# Patient Record
Sex: MALE | Race: BLACK OR AFRICAN AMERICAN | Employment: UNEMPLOYED | URBAN - METROPOLITAN AREA
[De-identification: names, ages, dates, MRNs, and addresses within clinical notes are randomized per-mention and may not be internally consistent; named-entity substitution may affect disease eponyms.]

---

## 2018-04-16 ENCOUNTER — OFFICE VISIT (OUTPATIENT)
Dept: FAMILY MEDICINE CLINIC | Age: 33
End: 2018-04-16

## 2018-04-16 ENCOUNTER — TELEPHONE (OUTPATIENT)
Dept: FAMILY MEDICINE CLINIC | Age: 33
End: 2018-04-16

## 2018-04-16 VITALS
HEIGHT: 72 IN | TEMPERATURE: 98.2 F | WEIGHT: 165.4 LBS | HEART RATE: 70 BPM | BODY MASS INDEX: 22.4 KG/M2 | DIASTOLIC BLOOD PRESSURE: 80 MMHG | SYSTOLIC BLOOD PRESSURE: 122 MMHG | RESPIRATION RATE: 16 BRPM | OXYGEN SATURATION: 95 %

## 2018-04-16 DIAGNOSIS — M54.2 CHRONIC NECK AND BACK PAIN: ICD-10-CM

## 2018-04-16 DIAGNOSIS — D86.9 SARCOIDOSIS: ICD-10-CM

## 2018-04-16 DIAGNOSIS — G89.29 CHRONIC NECK AND BACK PAIN: ICD-10-CM

## 2018-04-16 DIAGNOSIS — F43.10 POST TRAUMATIC STRESS DISORDER (PTSD): ICD-10-CM

## 2018-04-16 DIAGNOSIS — Z00.00 ROUTINE GENERAL MEDICAL EXAMINATION AT A HEALTH CARE FACILITY: Primary | ICD-10-CM

## 2018-04-16 DIAGNOSIS — M54.9 CHRONIC NECK AND BACK PAIN: ICD-10-CM

## 2018-04-16 NOTE — TELEPHONE ENCOUNTER
Please advise Mr. Arora that I have reviewed his hospital records and would recommend that his pulmonologist continue to direct his care and recommend any additional subspecialty consultation as he deems appropriate. I do not recommend any additional lab studies here at this time. A referral to Dr. Familia Garrett for psychiatry evaluation and treatment has been generated as we discussed. He is welcome to return here with any primary care  issues he would like to address.
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
## 2018-04-16 NOTE — PROGRESS NOTES
Tamia Stallings is a 26 y.o. male here to establish care       Tamia Stallings is a 26 y.o. male (: 1985) presenting to address:    Chief Complaint   Patient presents with   [address]     pt here to establish care        Vitals:    18 1510   BP: 122/80   Pulse: 70   Resp: 16   Temp: 98.2 °F (36.8 °C)   TempSrc: Oral   SpO2: 95%   Weight: 165 lb 6.4 oz (75 kg)   Height: 6' (1.829 m)   PainSc:   0 - No pain       Hearing/Vision:   No exam data present    Learning Assessment:     Learning Assessment 2018   PRIMARY LEARNER Patient   HIGHEST LEVEL OF EDUCATION - PRIMARY LEARNER  GRADUATED HIGH SCHOOL OR GED   BARRIERS PRIMARY LEARNER NONE   CO-LEARNER CAREGIVER No   PRIMARY LANGUAGE ENGLISH   LEARNER PREFERENCE PRIMARY DEMONSTRATION     READING   ANSWERED BY patient   RELATIONSHIP SELF     Depression Screening:     PHQ over the last two weeks 2018   Little interest or pleasure in doing things Not at all   Feeling down, depressed or hopeless Not at all   Total Score PHQ 2 0     Fall Risk Assessment:   No flowsheet data found. Abuse Screening:   No flowsheet data found. Coordination of Care Questionaire:   1. Have you been to the ER, urgent care clinic since your last visit? Hospitalized since your last visit? YES [facility]    2. Have you seen or consulted any other health care providers outside of the [address] [place] [address] since your last visit? Include any pap smears or colon screening. NO    Advanced Directive:   1. Do you have an Advanced Directive? NO    2. Would you like information on Advanced Directives?  NO

## 2018-04-17 ENCOUNTER — TELEPHONE (OUTPATIENT)
Dept: FAMILY MEDICINE CLINIC | Age: 33
End: 2018-04-17

## 2018-04-19 ENCOUNTER — HOSPITAL ENCOUNTER (OUTPATIENT)
Dept: PHYSICAL THERAPY | Age: 33
Discharge: HOME | End: 2018-04-19
Payer: SELF-PAY

## 2018-04-19 PROCEDURE — 97140 MANUAL THERAPY 1/> REGIONS: CPT

## 2018-04-19 PROCEDURE — 97535 SELF CARE MNGMENT TRAINING: CPT

## 2018-04-19 PROCEDURE — 97162 PT EVAL MOD COMPLEX 30 MIN: CPT

## 2018-04-19 NOTE — PROGRESS NOTES
PHYSICAL THERAPY - DAILY TREATMENT NOTE    Patient Name: Ryan Handley        Date: 2018  : 1985   YES Patient  Verified  Visit #:      12  Insurance: Payor: /      In time: 11:05 Out time: 11:45   Total Treatment Time: 40     Medicare Time Tracking (below)   Total Timed Codes (min):  na 1:1 Treatment Time:  na     TREATMENT AREA =  Chronic neck and back pain [M54.2, M54.9]    SUBJECTIVE  Pain Level (on 0 to 10 scale):    Medication Changes/New allergies or changes in medical history, any new surgeries or procedures? NO    If yes, update Summary List   Subjective Functional Status/Changes:  []  No changes reported     SEE IE          OBJECTIVE      10 min Manual Therapy: C/s mob, stm R c/s para, ut   Rationale:      decrease pain, increase ROM and increase tissue extensibility to improve patient's ability to perform ADLS     min Patient Education:  YES  Reviewed HEP   []  Progressed/Changed HEP based on: Other Objective/Functional Measures:    SEE IE     Post Treatment Pain Level (on 0 to 10) scale:       ASSESSMENT  Assessment/Changes in Function:     SEE IE     []  See Progress Note/Recertification   Patient will continue to benefit from skilled PT services to modify and progress therapeutic interventions, address functional mobility deficits, address ROM deficits, address strength deficits, analyze and address soft tissue restrictions, analyze and cue movement patterns, analyze and modify body mechanics/ergonomics and assess and modify postural abnormalities to attain remaining goals.    Progress toward goals / Updated goals:         PLAN  []  Upgrade activities as tolerated YES Continue plan of care   []  Discharge due to :    []  Other:      Therapist: Raul Toure, PT, OCS, SCS, CSCS    Date: 2018 Time: 11:35 AM       Future Appointments  Date Time Provider Barry Ndiaye   2018 11:00 AM Loreta Kurtz, PT [facility]   2018 11:00 AM Tom Herr, PTA [facility] 4/26/2018 11:00 AM YAMILKA [facility]
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
## 2018-04-20 ENCOUNTER — HOSPITAL ENCOUNTER (OUTPATIENT)
Dept: PHYSICAL THERAPY | Age: 33
End: 2018-04-20
Payer: SELF-PAY

## 2018-04-24 ENCOUNTER — HOSPITAL ENCOUNTER (OUTPATIENT)
Dept: PHYSICAL THERAPY | Age: 33
Discharge: HOME | End: 2018-04-24
Payer: SELF-PAY

## 2018-04-24 PROCEDURE — 97110 THERAPEUTIC EXERCISES: CPT

## 2018-04-24 PROCEDURE — 97140 MANUAL THERAPY 1/> REGIONS: CPT

## 2018-04-24 NOTE — PROGRESS NOTES
PHYSICAL THERAPY - DAILY TREATMENT NOTE    Patient Name: Iris Vogle        Date: 2018  : 1985   YES Patient  Verified  Visit #:      of   12  Insurance: Payor: SELF PAY / Plan: [facility] SELF PAY / Product Type: Self Pay /      In time: 11 Out time: 1150   Total Treatment Time: 50     Medicare Time Tracking (below)   Total Timed Codes (min):  50 1:1 Treatment Time:       TREATMENT AREA =  Chronic neck and back pain [M54.2, M54.9]    SUBJECTIVE  Pain Level (on 0 to 10 scale):  5  / 10   Medication Changes/New allergies or changes in medical history, any new surgeries or procedures? NO    If yes, update Summary List   Subjective Functional Status/Changes:  []  No changes reported     Reports pain and tingling down the (R) leg. OBJECTIVE    35 min Therapeutic Exercise:  [x]  See flow sheet   Rationale:      increase ROM, increase strength, improve coordination and improve balance to improve the patients ability to perform pain free ADLs. 15 min Manual Therapy: TPR (R) lumbar paraspinals, QL, glute med. Rationale:      decrease pain, increase ROM, increase tissue extensibility and decrease trigger points to improve patient's ability to perform pain free ADLs. min Patient Education:  YES  Reviewed HEP   []  Progressed/Changed HEP based on: Other Objective/Functional Measures: Therex per flow sheet. Post Treatment Pain Level (on 0 to 10) scale:   5  / 10     ASSESSMENT  Assessment/Changes in Function:     Pt reporting pain with all movement, very slow and methodical with movement.        []  See Progress Note/Recertification   Patient will continue to benefit from skilled PT services to modify and progress therapeutic interventions, address functional mobility deficits, address ROM deficits, address strength deficits, analyze and address soft tissue restrictions, analyze and cue movement patterns, analyze and modify body mechanics/ergonomics and assess and modify postural abnormalities to attain remaining goals. Progress toward goals / Updated goals:    Initiated therex.       PLAN  [x]  Upgrade activities as tolerated YES Continue plan of care   []  Discharge due to :    []  Other:      Therapist: Julian Roger PTA    Date: 4/24/2018 Time: 11:11 AM     Future Appointments  Date Time Provider Barry Ndiaye   4/26/2018 11:00 AM Julian Roger PTA [facility]   5/1/2018 11:00 AM Natalie Leone, PT [address] [place] [address]   5/3/2018 11:30 AM Julian Roger PTA [facility]   5/8/2018 11:00 AM Julian Roger PTA [facility]   5/10/2018 11:30 AM Julian Roger PTA [facility]   5/15/2018 11:00 AM Julian Roger PTA [facility]   5/17/2018 10:00 AM Natalie Leone, PT [facility]   5/22/2018 11:30 AM Natalie Leone, PT [facility]   5/24/2018 11:30 AM Julian Roger PTA [facility]   5/29/2018 11:00 AM Julian Roger PTA [facility]   5/31/2018 11:30 AM Julian Roger PTA [facility]

## 2018-04-25 DIAGNOSIS — Z86.39 HISTORY OF HYPERGLYCEMIA: Primary | ICD-10-CM

## 2018-04-26 ENCOUNTER — APPOINTMENT (OUTPATIENT)
Dept: PHYSICAL THERAPY | Age: 33
End: 2018-04-26
Payer: SELF-PAY

## 2018-05-01 ENCOUNTER — HOSPITAL ENCOUNTER (OUTPATIENT)
Dept: PHYSICAL THERAPY | Age: 33
Discharge: HOME | End: 2018-05-01
Payer: SELF-PAY

## 2018-05-01 ENCOUNTER — TELEPHONE (OUTPATIENT)
Dept: FAMILY MEDICINE CLINIC | Age: 33
End: 2018-05-01

## 2018-05-01 PROCEDURE — 97140 MANUAL THERAPY 1/> REGIONS: CPT

## 2018-05-01 PROCEDURE — 97110 THERAPEUTIC EXERCISES: CPT

## 2018-05-01 NOTE — TELEPHONE ENCOUNTER
Patient is needing to have a referral to a new psychiatry. The provider that the patient was being referred to no longer take new psych referrals.     Please assist

## 2018-05-01 NOTE — PROGRESS NOTES
PHYSICAL THERAPY - DAILY TREATMENT NOTE    Patient Name: Preston Weeks        Date: 2018  : 1985   YES Patient  Verified  Visit #:   3   of   12  Insurance: Payor: /      In time: 10:59 Out time: 11:49   Total Treatment Time: 50     Medicare Time Tracking (below)   Total Timed Codes (min):  na 1:1 Treatment Time:  na     TREATMENT AREA =  Chronic neck and back pain [M54.2, M54.9]    SUBJECTIVE  Pain Level (on 0 to 10 scale):  4  / 10   Medication Changes/New allergies or changes in medical history, any new surgeries or procedures? NO    If yes, update Summary List   Subjective Functional Status/Changes:  []  No changes reported     The middle of my back has been bothering me, but the leg pain is a little better          OBJECTIVE  35 min Therapeutic Exercise:  [x]  See flow sheet   Rationale:      increase ROM, increase strength, improve coordination and improve balance to improve the patients ability to perform pain free ADLs.    15 min Manual Therapy: TPR (R) lumbar paraspinals, QL, glute med, t/s mob   Rationale:      decrease pain, increase ROM, increase tissue extensibility and decrease trigger points to improve patient's ability to perform pain free ADLs.     min Patient Education:  YES  Reviewed HEP   []  Progressed/Changed HEP based on:         Other Objective/Functional Measures:    Cont to have sig TTP noted at lumbar para  Decreased mid t/s mobility noted today     Post Treatment Pain Level (on 0 to 10) scale:   3- 10     ASSESSMENT  Assessment/Changes in Function:     Good ratna to all Rx without increase in pain      []  See Progress Note/Recertification   Patient will continue to benefit from skilled PT services to modify and progress therapeutic interventions, address functional mobility deficits, address ROM deficits, address strength deficits, analyze and address soft tissue restrictions, analyze and cue movement patterns, analyze and modify body mechanics/ergonomics and assess and modify postural abnormalities to attain remaining goals.    Progress toward goals / Updated goals:    Slow progress with pain reduction      PLAN  []  Upgrade activities as tolerated YES Continue plan of care   []  Discharge due to :    []  Other:      Therapist: Daniel Nolasco, PT, OCS, SCS, CSCS    Date: 5/1/2018 Time: 10:10 AM       Future Appointments  Date Time Provider Barry Ndiaye   5/1/2018 11:00 AM Nathan Betancourt, PT [facility]   5/3/2018 11:30 AM Claudette Balling, PTA [facility]   5/8/2018 11:00 AM Claudette Balling, PTA [facility]   5/10/2018 11:30 AM Claudette Balling, [facility]   5/15/2018 11:00 AM Claudette Balling, [facility]   5/17/2018 10:00 AM Nathan Betancourt, PT [facility]   5/22/2018 11:30 AM Nathan Betancourt, PT [facility]   5/24/2018 11:30 AM Claudette Balling, [facility]   5/29/2018 11:00 AM Claudette Balling, [facility]   5/31/2018 11:30 AM Claudette Balling, [facility]

## 2018-05-01 NOTE — TELEPHONE ENCOUNTER
Spoke with pt.  Given  info as far as names of facilities and numbers to call to see if he could be seen

## 2018-05-03 ENCOUNTER — HOSPITAL ENCOUNTER (OUTPATIENT)
Dept: PHYSICAL THERAPY | Age: 33
Discharge: HOME | End: 2018-05-03
Payer: SELF-PAY

## 2018-05-03 PROCEDURE — 97110 THERAPEUTIC EXERCISES: CPT

## 2018-05-03 PROCEDURE — 97140 MANUAL THERAPY 1/> REGIONS: CPT

## 2018-05-03 NOTE — PROGRESS NOTES
PHYSICAL THERAPY - DAILY TREATMENT NOTE    Patient Name: Jimi Beckman        Date: 5/3/2018  : 1985   YES Patient  Verified  Visit #:      of   12  Insurance: Payor: SELF PAY / Plan: [facility] SELF PAY / Product Type: Self Pay /      In time: 1130 Out time: 1230   Total Treatment Time: 60     Medicare Time Tracking (below)   Total Timed Codes (min):  60 1:1 Treatment Time:       TREATMENT AREA =  Chronic neck and back pain [M54.2, M54.9]    SUBJECTIVE  Pain Level (on 0 to 10 scale):  3  / 10   Medication Changes/New allergies or changes in medical history, any new surgeries or procedures? NO    If yes, update Summary List   Subjective Functional Status/Changes:  []  No changes reported     No new complaints. OBJECTIVE      40 min Therapeutic Exercise:  [x]  See flow sheet   Rationale:      increase ROM, increase strength and improve coordination to improve the patients ability to perform pain free ADLs. 20 min Manual Therapy: STM/DTM (R) lumbar and thoracic paraspinals. Rationale:      decrease pain, increase ROM, increase tissue extensibility and decrease trigger points to improve patient's ability to perform pain free ADLs. min Patient Education:  YES  Reviewed HEP   []  Progressed/Changed HEP based on: Other Objective/Functional Measures: Therex per flow sheet. Post Treatment Pain Level (on 0 to 10) scale:   5  / 10     ASSESSMENT  Assessment/Changes in Function:     TTP (R) t/s and lumbar paraspinals w/ multiple trigger points noted. []  See Progress Note/Recertification   Patient will continue to benefit from skilled PT services to modify and progress therapeutic interventions, address functional mobility deficits, address ROM deficits, address strength deficits, analyze and address soft tissue restrictions, analyze and cue movement patterns, analyze and modify body mechanics/ergonomics and assess and modify postural abnormalities to attain remaining goals. Progress toward goals / Updated goals:  No change in progress toward LTG's with today's session.       PLAN  [x]  Upgrade activities as tolerated YES Continue plan of care   []  Discharge due to :    []  Other:      Therapist: Felicitas Doherty PTA    Date: 5/3/2018 Time: 11:50 AM     Future Appointments  Date Time Provider Barry Ndiaye   5/8/2018 11:00 AM Felicitas Doherty PTA [facility]   5/10/2018 11:30 AM Felicitas Doherty PTA [facility]   5/15/2018 11:00 AM Felicitas Doherty PTA [facility]   5/17/2018 10:00 AM Dina Garcia, PT [facility]   5/22/2018 11:30 AM Dina Garcia, PT [facility]   5/24/2018 11:30 AM Felicitas Doherty PTA [facility]   5/29/2018 11:00 AM Felicitas Doherty PTA [facility]   5/31/2018 11:30 AM Felicitas Doherty PTA [facility]

## 2018-05-08 ENCOUNTER — HOSPITAL ENCOUNTER (OUTPATIENT)
Dept: PHYSICAL THERAPY | Age: 33
Discharge: HOME | End: 2018-05-08
Payer: SELF-PAY

## 2018-05-08 PROCEDURE — 97110 THERAPEUTIC EXERCISES: CPT

## 2018-05-08 PROCEDURE — 97140 MANUAL THERAPY 1/> REGIONS: CPT

## 2018-05-08 NOTE — PROGRESS NOTES
PHYSICAL THERAPY - DAILY TREATMENT NOTE    Patient Name: Coleen Curry        Date: 2018  : 1985   YES Patient  Verified  Visit #:      of   12  Insurance: Payor: SELF PAY / Plan: [facility] SELF PAY / Product Type: Self Pay /      In time: 1055 Out time: 1145   Total Treatment Time: 50     Medicare Time Tracking (below)   Total Timed Codes (min):  50 1:1 Treatment Time:       TREATMENT AREA =  Chronic neck and back pain [M54.2, M54.9]    SUBJECTIVE  Pain Level (on 0 to 10 scale):    / 10   Medication Changes/New allergies or changes in medical history, any new surgeries or procedures? NO    If yes, update Summary List   Subjective Functional Status/Changes:  []  No changes reported     Pt reporting numbness into the (L) foot, some into the (R) hand as well. OBJECTIVE  35 min Therapeutic Exercise:  [x]  See flow sheet   Rationale:      increase ROM, increase strength and improve coordination to improve the patients ability to perform pain free ADLs. 15 min Manual Therapy: STM/DTM (B) lumbar and thoracic paraspinals, glute/piriformis. Rationale:      decrease pain, increase ROM, increase tissue extensibility and decrease trigger points to improve patient's ability to perform pain free ADLs. min Patient Education:  YES  Reviewed HEP   []  Progressed/Changed HEP based on: Other Objective/Functional Measures: Therex per flow sheet. Post Treatment Pain Level (on 0 to 10) scale:   6  / 10     ASSESSMENT  Assessment/Changes in Function:     Continued tightness on the (R) thoracic/lumbar paraspinals.        []  See Progress Note/Recertification   Patient will continue to benefit from skilled PT services to modify and progress therapeutic interventions, address functional mobility deficits, address ROM deficits, address strength deficits, analyze and address soft tissue restrictions, analyze and cue movement patterns, analyze and modify body mechanics/ergonomics and assess and modify postural abnormalities to attain remaining goals. Progress toward goals / Updated goals:    Continued strengthening to progress toward LTG #2.       PLAN  [x]  Upgrade activities as tolerated YES Continue plan of care   []  Discharge due to :    []  Other:      Therapist: Kenneth Vela PTA    Date: 5/8/2018 Time: 11:12 AM     Future Appointments  Date Time Provider Barry Ndiaye   5/10/2018 10:00 AM Verona Hylton, PT [facility]   5/15/2018 10:30 AM Verona Hylton, PT [facility]   5/17/2018 10:00 AM Verona Hylton, PT [facility]   5/22/2018 11:30 AM Verona Hylton, PT [facility]   5/24/2018 8:00 AM Verona Hylton, PT [facility]   5/29/2018 11:00 AM Kenneth Vela PTA [facility]   5/31/2018 11:30 AM Kenneth Vela PTA [facility]

## 2018-05-10 ENCOUNTER — HOSPITAL ENCOUNTER (OUTPATIENT)
Dept: PHYSICAL THERAPY | Age: 33
Discharge: HOME | End: 2018-05-10
Payer: SELF-PAY

## 2018-05-10 PROCEDURE — 97140 MANUAL THERAPY 1/> REGIONS: CPT

## 2018-05-10 PROCEDURE — 97110 THERAPEUTIC EXERCISES: CPT

## 2018-05-10 NOTE — PROGRESS NOTES
PHYSICAL THERAPY - DAILY TREATMENT NOTE    Patient Name: Tamia Stallings        Date: 5/10/2018  : 1985   YES Patient  Verified  Visit #:     Insurance: Payor: SELF PAY / Plan: [facility] SELF PAY / Product Type: Self Pay /      In time: 10:00 Out time: 10:48   Total Treatment Time: 48     Medicare Time Tracking (below)   Total Timed Codes (min):  na 1:1 Treatment Time:  na     TREATMENT AREA =  Chronic neck and back pain [M54.2, M54.9]    SUBJECTIVE  Pain Level (on 0 to 10 scale):  3- 10   Medication Changes/New allergies or changes in medical history, any new surgeries or procedures? NO    If yes, update Summary List   Subjective Functional Status/Changes:  []  No changes reported     Still gets leg pain if I stay in a certain position for too long. OBJECTIVE  35 min Therapeutic Exercise:  [x]  See flow sheet   Rationale:      increase ROM, increase strength and improve coordination to improve the patients ability to perform pain free ADLs.    13 min Manual Therapy: STM/DTM (B) lumbar and thoracic paraspinals, glute/piriformis. Rationale:      decrease pain, increase ROM, increase tissue extensibility and decrease trigger points to improve patient's ability to perform pain free ADLs. min Patient Education:  YES  Reviewed HEP   []  Progressed/Changed HEP based on:         Other Objective/Functional Measures:    Reeducated about avoiding any flx type of activity including sitting      Post Treatment Pain Level (on 0 to 10) scale:   4   10     ASSESSMENT  Assessment/Changes in Function:     Good ratna to all Rx with min increase in pain      []  See Progress Note/Recertification   Patient will continue to benefit from skilled PT services to modify and progress therapeutic interventions, address functional mobility deficits, address ROM deficits, address strength deficits, analyze and address soft tissue restrictions, analyze and cue movement patterns, analyze and modify body mechanics/ergonomics and assess and modify postural abnormalities to attain remaining goals.    Progress toward goals / Updated goals:    No sig change towards LTGS today     PLAN  []  Upgrade activities as tolerated YES Continue plan of care   []  Discharge due to :    []  Other:      Therapist: Irasema Rivero, PT, OCS, SCS, CSCS    Date: 5/10/2018 Time: 6:22 AM       Future Appointments  Date Time Provider Barry Ndiaye   5/10/2018 10:00 AM Pleasant Hoop, PT [facility]   5/15/2018 10:30 AM Pleasant Hoop, PT [address] [place] [address]   5/17/2018 10:00 AM Pleasant Hoop, PT [facility]   5/22/2018 11:30 AM Pleasant Hoop, PT [facility]   5/24/2018 8:00 AM Pleasant Hoop, PT [facility]   5/29/2018 11:00 AM Cata Rosas PTA [facility]   5/31/2018 11:30 AM Cata Rosas PTA [facility]

## 2018-05-15 ENCOUNTER — HOSPITAL ENCOUNTER (OUTPATIENT)
Dept: PHYSICAL THERAPY | Age: 33
Discharge: HOME | End: 2018-05-15
Payer: SELF-PAY

## 2018-05-15 PROCEDURE — 97140 MANUAL THERAPY 1/> REGIONS: CPT

## 2018-05-15 PROCEDURE — 97110 THERAPEUTIC EXERCISES: CPT

## 2018-05-17 ENCOUNTER — HOSPITAL ENCOUNTER (OUTPATIENT)
Dept: PHYSICAL THERAPY | Age: 33
Discharge: HOME | End: 2018-05-17
Payer: SELF-PAY

## 2018-05-17 PROCEDURE — 97014 ELECTRIC STIMULATION THERAPY: CPT

## 2018-05-17 PROCEDURE — 97110 THERAPEUTIC EXERCISES: CPT

## 2018-05-17 PROCEDURE — 97140 MANUAL THERAPY 1/> REGIONS: CPT

## 2018-05-17 NOTE — PROGRESS NOTES
Encounter Date: 1/19/2018       History     Chief Complaint   Patient presents with    Influenza     9yo F with h/o asthma. Pt here for complaint of vomiting. Onset was this morning after taking sisters tamiflu. Blood in vomiting, streaks of blood x1. Pt has had cough, congestion and fever for 3 days. Sister and father also with similar symptoms. Sister and father were treated for FLu.            Review of patient's allergies indicates:  No Known Allergies  Past Medical History:   Diagnosis Date    Strep throat      Past Surgical History:   Procedure Laterality Date    ADENOIDECTOMY  7/25/13     Family History   Problem Relation Age of Onset    Anesthesia problems Neg Hx     Clotting disorder Neg Hx      Social History   Substance Use Topics    Smoking status: Never Smoker    Smokeless tobacco: Never Used    Alcohol use No     Review of Systems   Constitutional: Positive for activity change, appetite change and fever.   HENT: Positive for congestion.    Eyes: Negative.    Respiratory: Positive for cough.    Cardiovascular: Negative.    Gastrointestinal: Positive for nausea and vomiting.   Genitourinary: Negative.    Musculoskeletal: Positive for myalgias.   Skin: Negative.    Psychiatric/Behavioral: Negative.        Physical Exam     Initial Vitals [01/19/18 0841]   BP Pulse Resp Temp SpO2   -- (!) 120 20 99 °F (37.2 °C) 98 %      MAP       --         Physical Exam    Vitals reviewed.  Constitutional: She appears well-developed and well-nourished.   HENT:   Right Ear: Tympanic membrane normal.   Left Ear: Tympanic membrane normal.   Nose: Nasal discharge present.   Mouth/Throat: Mucous membranes are moist. Dentition is normal. Oropharynx is clear.   Eyes: Conjunctivae and EOM are normal. Pupils are equal, round, and reactive to light.   Neck: Normal range of motion.   Cardiovascular: Normal rate, regular rhythm, S1 normal and S2 normal.   No murmur heard.  Pulmonary/Chest: Effort normal and breath sounds  PHYSICAL THERAPY - DAILY TREATMENT NOTE    Patient Name: Leartis Lesches        Date: 2018  : 1985   YES Patient  Verified  Visit #:   8      12  Insurance: Payor: SELF PAY / Plan: [facility] SELF PAY / Product Type: Self Pay /      In time: 10:00 Out time: 10:50   Total Treatment Time: 50     Medicare Time Tracking (below)   Total Timed Codes (min):  na 1:1 Treatment Time:  na     TREATMENT AREA =  Chronic neck and back pain [M54.2, M54.9]    SUBJECTIVE  Pain Level (on 0 to 10 scale):  4  / 10   Medication Changes/New allergies or changes in medical history, any new surgeries or procedures? NO    If yes, update Summary List   Subjective Functional Status/Changes:  []  No changes reported     I was in pain last night, but once I moved around it got a little better. My pain gets a little better once I recover from soreness then the pain comes back in a little while. 7/10 at the worst.          OBJECTIVE  Modalities Rationale:     decrease pain to improve patient's ability to perform ADLs  10 min [x] Estim, type/location:  IFC                                    []  att     [x]  unatt     []  w/US     []  w/ice    [x]  w/heat    min []  Mechanical Traction: type/lbs                   []  pro   []  sup   []  int   []  cont    []  before manual    []  after manual    min []  Ultrasound, settings/location:      min []  Iontophoresis w/ dexamethasone, location:                                               []  take home patch       []  in clinic    min []  Ice     []  Heat    location/position:     min []  Vasopneumatic Device, press/temp:     min []  Other:    [] Skin assessment post-treatment (if applicable):    []  intact    []  redness- no adverse reaction     []redness  adverse reaction:      30 min Therapeutic Exercise:  [x]  See flow sheet   Rationale:      increase ROM, increase strength and improve coordination to improve the patients ability to perform pain free ADLs.         10 min Manual Therapy: STM/DTM  lumbar and thoracic paraspinals, glute/piriformis.     Rationale:      decrease pain, increase ROM, increase tissue extensibility and decrease trigger points to improve patient's ability to perform pain free ADLs. min Patient Education:  YES  Reviewed HEP   []  Progressed/Changed HEP based on: Other Objective/Functional Measures:    FOTO = 40  GROC = +1     Post Treatment Pain Level (on 0 to 10) scale:   3  / 10     ASSESSMENT  Assessment/Changes in Function:     Good ratna to all Rx without increase in pain      []  See Progress Note/Recertification   Patient will continue to benefit from skilled PT services to modify and progress therapeutic interventions, address functional mobility deficits, address ROM deficits, address strength deficits, analyze and address soft tissue restrictions, analyze and cue movement patterns, analyze and modify body mechanics/ergonomics and assess and modify postural abnormalities to attain remaining goals.    Progress toward goals / Updated goals:    No sig change towards LTGs      PLAN  []  Upgrade activities as tolerated YES Continue plan of care   []  Discharge due to :    []  Other:      Therapist: Zeny López, PT, OCS, SCS, CSCS    Date: 5/17/2018 Time: 9:13 AM       Future Appointments  Date Time Provider Barry Ndiaye   5/17/2018 10:00 AM Rudy Kinney PT [facility]   5/22/2018 11:30 AM Rudy Kinney PT [facility]   5/24/2018 8:00 AM Rudy Kinney PT [facility]   5/29/2018 11:00 AM Sarah Alejandro PTA [facility]   5/31/2018 11:30 AM Sarah Alejandro PTA [facility] normal. No stridor. No respiratory distress. Air movement is not decreased. She has no wheezes. She has no rales. She exhibits no retraction.   Abdominal: Soft. Bowel sounds are normal. She exhibits no distension. There is no tenderness. There is no guarding.   Musculoskeletal: Normal range of motion.   Neurological: She is alert.   Skin: Skin is warm. Capillary refill takes less than 2 seconds.         ED Course   Procedures  Labs Reviewed - No data to display          Medical Decision Making:   Initial Assessment:    11yo F with cough, fever, vomiting and congestion; +FLU contact at home; DDX includes but not limited pneumonia, URI, influenza, AOM, or other. Well appearing on exam with clear BS, very active. .    ED Management:  Flu exposure, URI symptpms and vomiting.     Ondansetron for vomiting here in the ER.     Observe and PO challenge.                        ED Course      Clinical Impression:   There were no encounter diagnoses.                           Christiano Aviles MD  01/19/18 7178

## 2018-05-18 NOTE — PROGRESS NOTES
[address] PHYSICAL THERAPY  [address] [place] [address] 51, [place] [address],[facility], [facility] - Phone: [phone]  Fax: [phone]  PROGRESS NOTE  Patient Name: Raffi Rodriguez : 1985   Treatment/Medical Diagnosis: Chronic neck and back pain [M54.2, M54.9]   Referral Source: Dasia Garcia MD     Date of Initial Visit: 18 Attended Visits: 8 Missed Visits: 0     SUMMARY OF TREATMENT  Raffi Rodriguez has been seen at our clinic 2-3x/wk for a total of 8 visits. Pt treatment has consisted of  therapeutic exercise for directionally preferred ROM ex, hip/core strengthening, and manual therapy(jt mobilization and deep tissue mobilization)  CURRENT STATUS  Pt has had a good tolerance to physical therapy treatment. He reports being compliant with home exercise program which includes directional preferred exercise to reduce discogenic LE symptom. However, he continues to continues to complain of difficulty and increase pain with prolonged sitting and bending. He may benefit from further diagnostic testing and intervention such as an injection. Goal/Measure of Progress Goal Met? 1. Decrease Max pain by 50-75% to assist with ADLs   Status at last Eval: 8/10 Current Status: 7/10 progressing   2. Increase FOTO score to 59 % show functional improvement   Status at last Eval: 39% Current Status: 40% progressing   3. Will rate >/= +5 on Global Rating of Change and be prepared to DC to HEP. Status at last Eval: na Current Status: +1 progressing     New Goals to be achieved in __4__  weeks:  1. Continue with all goals above   2.     3.     RECOMMENDATIONS    Specifics: 2-3x/wk x 4 more wks  If you have any questions/comments please contact us directly at [phone]. Thank you for allowing us to assist in the care of your patient.     Therapist Signature: Jason Shaikh DPT, OCS, SCS, CSCS Date: 2018     Time: 7:34 AM   NOTE TO PHYSICIAN:  PLEASE COMPLETE THE ORDERS BELOW AND FAX TO   [facility]: [phone]  If you are unable to process this request in 24 hours please contact our office: [phone]    ___ I have read the above report and request that my patient continue as recommended.   ___ I have read the above report and request that my patient continue therapy with the following changes/special instructions:_________________________________________________________   ___ I have read the above report and request that my patient be discharged from therapy.      Physician Signature:        Date:       Time:

## 2018-05-22 ENCOUNTER — HOSPITAL ENCOUNTER (OUTPATIENT)
Dept: PHYSICAL THERAPY | Age: 33
Discharge: HOME | End: 2018-05-22
Payer: SELF-PAY

## 2018-05-22 PROCEDURE — 97110 THERAPEUTIC EXERCISES: CPT

## 2018-05-22 PROCEDURE — 97014 ELECTRIC STIMULATION THERAPY: CPT

## 2018-05-22 PROCEDURE — 97140 MANUAL THERAPY 1/> REGIONS: CPT

## 2018-05-22 NOTE — PROGRESS NOTES
PHYSICAL THERAPY - DAILY TREATMENT NOTE    Patient Name: Michelle Banks        Date: 2018  : 1985   YES Patient  Verified  Visit #:     Insurance: Payor: SELF PAY / Plan: [facility] SELF PAY / Product Type: Self Pay /      In time: 11:25 Out time: 12:35   Total Treatment Time: 70     Medicare Time Tracking (below)   Total Timed Codes (min):  na 1:1 Treatment Time:  na     TREATMENT AREA =  Chronic neck and back pain [M54.2, M54.9]    SUBJECTIVE  Pain Level (on 0 to 10 scale):  4  / 10   Medication Changes/New allergies or changes in medical history, any new surgeries or procedures? NO    If yes, update Summary List   Subjective Functional Status/Changes:  []  No changes reported     Actually, it wasn't too bad this weekend. The stim might have helped. OBJECTIVE  Modalities Rationale:     decrease pain to improve patient's ability to perform ADLs              10 min [x] Estim, type/location:                                  IFC                                    []  att     [x]  unatt     []  w/US     []  w/ice    [x]  w/heat     min []  Mechanical Traction: type/lbs                    []  pro   []  sup   []  int   []  cont    []  before manual    []  after manual     min []  Ultrasound, settings/location:        min []  Iontophoresis w/ dexamethasone, location:                                                []  take home patch       []  in clinic     min []  Ice     []  Heat    location/position:       min []  Vasopneumatic Device, press/temp:       min []  Other:     [] Skin assessment post-treatment (if applicable):    []  intact    []  redness- no adverse reaction     []redness  adverse reaction:      30 min Therapeutic Exercise:  [x]  See flow sheet   Rationale:      increase ROM, increase strength and improve coordination to improve the patients ability to perform pain free ADLs.         20 min Manual Therapy: STM/DTM  lumbar and thoracic paraspinals, glute/piriformis.   Rationale:      decrease pain, increase ROM, increase tissue extensibility and decrease trigger points to improve patient's ability to perform pain free ADLs.       min Patient Education:  YES  Reviewed HEP   []  Progressed/Changed HEP based on: Other Objective/Functional Measures:    Decreased soft tissue tension noted at L para  Increased ROM into ext with prone press ups      Post Treatment Pain Level (on 0 to 10) scale:   3  / 10     ASSESSMENT  Assessment/Changes in Function:     Good ratna to all Rx without increase in pain      []  See Progress Note/Recertification   Patient will continue to benefit from skilled PT services to modify and progress therapeutic interventions, address functional mobility deficits, address ROM deficits, address strength deficits, analyze and address soft tissue restrictions, analyze and cue movement patterns, analyze and modify body mechanics/ergonomics and assess and modify postural abnormalities to attain remaining goals.    Progress toward goals / Updated goals:    Slow progress with pain reduction      PLAN  []  Upgrade activities as tolerated YES Continue plan of care   []  Discharge due to :    []  Other:      Therapist: Anam Pradhan, PT, OCS, SCS, CSCS    Date: 5/22/2018 Time: 10:07 AM       Future Appointments  Date Time Provider Barry Ndiaye   5/22/2018 11:30 AM Aidan Santana PT [facility]   5/24/2018 8:00 AM Aidan Santana PT [facility]   5/29/2018 11:00 AM Kaushal Lindsey PTA [facility]   5/31/2018 11:30 AM Kaushal Lindsey PTA [facility]

## 2018-05-24 ENCOUNTER — OFFICE VISIT (OUTPATIENT)
Dept: FAMILY MEDICINE CLINIC | Age: 33
End: 2018-05-24

## 2018-05-24 ENCOUNTER — HOSPITAL ENCOUNTER (OUTPATIENT)
Dept: PHYSICAL THERAPY | Age: 33
Discharge: HOME | End: 2018-05-24
Payer: SELF-PAY

## 2018-05-24 VITALS
TEMPERATURE: 98.5 F | OXYGEN SATURATION: 98 % | DIASTOLIC BLOOD PRESSURE: 88 MMHG | HEIGHT: 72 IN | BODY MASS INDEX: 23.03 KG/M2 | RESPIRATION RATE: 18 BRPM | SYSTOLIC BLOOD PRESSURE: 130 MMHG | HEART RATE: 76 BPM | WEIGHT: 170 LBS

## 2018-05-24 DIAGNOSIS — D86.9 SARCOIDOSIS: ICD-10-CM

## 2018-05-24 DIAGNOSIS — M54.16 RIGHT LUMBAR RADICULOPATHY: Primary | ICD-10-CM

## 2018-05-24 PROCEDURE — 97014 ELECTRIC STIMULATION THERAPY: CPT

## 2018-05-24 PROCEDURE — 97110 THERAPEUTIC EXERCISES: CPT

## 2018-05-24 PROCEDURE — 97140 MANUAL THERAPY 1/> REGIONS: CPT

## 2018-05-24 NOTE — PROGRESS NOTES
Madeleine Buchanan is a 26 y.o. male here for back pain      Madeleine Buchanan is a 26 y.o. male (: 1985) presenting to address:    Chief Complaint   Patient presents with    Back Pain     pt states he's here for a follow up. discuss possible cortisone shots        Vitals:    18 0959   BP: 130/88   Pulse: 76   Resp: 18   Temp: 98.5 °F (36.9 °C)   TempSrc: Oral   SpO2: 98%   Weight: 170 lb (77.1 kg)   Height: 6' (1.829 m)   PainSc:   4   PainLoc: Back       Hearing/Vision:   No exam data present    Learning Assessment:     Learning Assessment 2018   PRIMARY LEARNER Patient   HIGHEST LEVEL OF EDUCATION - PRIMARY LEARNER  GRADUATED HIGH SCHOOL OR GED   BARRIERS PRIMARY LEARNER NONE   CO-LEARNER CAREGIVER No   PRIMARY LANGUAGE ENGLISH   LEARNER PREFERENCE PRIMARY DEMONSTRATION     READING   ANSWERED BY patient   RELATIONSHIP SELF     Depression Screening:     PHQ over the last two weeks 2018   Little interest or pleasure in doing things Not at all   Feeling down, depressed or hopeless Not at all   Total Score PHQ 2 0     Fall Risk Assessment:   No flowsheet data found. Abuse Screening:   No flowsheet data found. Coordination of Care Questionaire:   1. Have you been to the ER, urgent care clinic since your last visit? Hospitalized since your last visit? NO    2. Have you seen or consulted any other health care providers outside of the [facility] since your last visit? Include any pap smears or colon screening. NO    Advanced Directive:   1. Do you have an Advanced Directive? NO    2. Would you like information on Advanced Directives?  NO

## 2018-05-24 NOTE — PROGRESS NOTES
PHYSICAL THERAPY - DAILY TREATMENT NOTE    Patient Name: Romelia Laureano        Date: 2018  : 1985   YES Patient  Verified  Visit #:   10   of   12  Insurance: Payor: SELF PAY / Plan: [facility] SELF PAY / Product Type: Self Pay /      In time: 8:00 Out time: 9:00   Total Treatment Time: 60     Medicare Time Tracking (below)   Total Timed Codes (min):  na 1:1 Treatment Time:  na     TREATMENT AREA =  Chronic neck and back pain [M54.2, M54.9]    SUBJECTIVE  Pain Level (on 0 to 10 scale):  4  / 10   Medication Changes/New allergies or changes in medical history, any new surgeries or procedures? NO    If yes, update Summary List   Subjective Functional Status/Changes:  []  No changes reported     No new c/o          OBJECTIVE  Modalities Rationale:     decrease pain to improve patient's ability to perform ADLs                        10 min [x] Estim, type/location:                                  FWO                                    []  att     [x]  unatt     []  w/US     []  w/ice    [x]  w/heat      min []  Mechanical Traction: type/lbs                     []  pro   []  sup   []  int   []  cont    []  before manual    []  after manual      min []  Ultrasound, settings/location:          min []  Iontophoresis w/ dexamethasone, location:                                                 []  take home patch       []  in clinic      min []  Ice     []  Heat    location/position:         min []  Vasopneumatic Device, press/temp:         min []  Other:      [] Skin assessment post-treatment (if applicable):    []  intact    []  redness- no adverse reaction     []redness  adverse reaction:      30 min Therapeutic Exercise:  [x]  See flow sheet   Rationale:      increase ROM, increase strength and improve coordination to improve the patients ability to perform pain free ADLs.         20 min Manual Therapy: STM/DTM  lumbar and thoracic paraspinals, QL glute/piriformis.     Rationale:      decrease pain, increase ROM, increase tissue extensibility and decrease trigger points to improve patient's ability to perform pain free ADLs.      min Patient Education:  YES  Reviewed HEP   []  Progressed/Changed HEP based on: Other Objective/Functional Measures:    TTP noted at prox QL noted     Post Treatment Pain Level (on 0 to 10) scale:   3  / 10     ASSESSMENT  Assessment/Changes in Function:     Good ratna to all Rx without increase in pain      []  See Progress Note/Recertification   Patient will continue to benefit from skilled PT services to modify and progress therapeutic interventions, address functional mobility deficits, address ROM deficits, address strength deficits, analyze and address soft tissue restrictions, analyze and cue movement patterns, analyze and modify body mechanics/ergonomics and assess and modify postural abnormalities to attain remaining goals.    Progress toward goals / Updated goals:    Slow progress with pain reduction     PLAN  []  Upgrade activities as tolerated YES Continue plan of care   []  Discharge due to :    []  Other:      Therapist: Zeny López, PT, OCS, SCS, CSCS    Date: 5/24/2018 Time: 6:28 AM       Future Appointments  Date Time Provider Barry Ndiaye   5/24/2018 10:00 AM Zheng Naranjo MD [facility]   5/29/2018 11:00 AM Sarah Alejandro PTA [facility]   5/31/2018 11:30 AM Sarah Alejandro PTA [facility]

## 2018-05-24 NOTE — MR AVS SNAPSHOT
[address] [facility] [address] 
[phone] Patient: Raj Santiago MRN: [id number] SFR:5/5/[birth year]34 Visit Information Date & Time Provider Department Dept. Phone Encounter #  
 5/24/2018 10:00 AM Sandra Clifford, [address] [facility] [phone] [id number] Upcoming Health Maintenance Date Due DTaP/Tdap/Td series (1 - Tdap) 4/7/2004 Influenza Age 5 to Adult 8/1/2018 Allergies as of 5/24/2018  Review Complete On: 5/24/2018 By: Sandra Clifford MD  
 No Known Allergies Current Immunizations  Never Reviewed No immunizations on file. Not reviewed this visit You Were Diagnosed With   
  
 Codes Comments Right lumbar radiculopathy    -  Primary ICD-10-CM: M54.16 
ICD-9-CM: 724.4 Vitals BP Pulse Temp Resp Height(growth percentile) Weight(growth percentile) 130/88 (BP 1 Location: Left arm, BP Patient Position: Sitting) 76 98.5 °F (36.9 °C) (Oral) 18 6' (1.829 m) 170 lb (77.1 kg) SpO2 BMI Smoking Status 98% 23.06 kg/m2 Never Smoker BMI and BSA Data Body Mass Index Body Surface Area 23.06 kg/m 2 1.98 m 2 Your Updated Medication List  
  
Notice  As of 5/24/2018 10:30 AM  
 You have not been prescribed any medications. To-Do List   
 05/24/2018 Imaging:  MRI LUMB SPINE WO CONT   
  
 05/29/2018 11:00 AM  
  Appointment with Yo Washington PTA at [address] [facility] ([phone])  
  
 05/31/2018 11:30 AM  
  Appointment with Yo Washington PTA at [address] [facility] ([phone]) Patient Instructions Lumbar MRI requested - Further disposition pending MRI results. Introducing [facility]! Yrn Salinas introduces "[employer]" patient portal. Now you can access parts of your medical record, email your doctor's office, and request medication refills online.    
 
1. In your internet browser, go to [email or web address] 2. Click on the First Time User? Click Here link in the Sign In box. You will see the New Member Sign Up page. 3. Enter your Acorns Access Code exactly as it appears below. You will not need to use this code after youve completed the sign-up process. If you do not sign up before the expiration date, you must request a new code. · Acorns Access Code: [email or web address] Expires: 7/15/2018  3:12 PM 
 
4. Enter the last four digits of your Social Security Number (xxxx) and Date of Birth (mm/dd/yyyy) as indicated and click Submit. You will be taken to the next sign-up page. 5. Create a Pop Up Archivet ID. This will be your Acorns login ID and cannot be changed, so think of one that is secure and easy to remember. 6. Create a Acorns password. You can change your password at any time. 7. Enter your Password Reset Question and Answer. This can be used at a later time if you forget your password. 8. Enter your e-mail address. You will receive e-mail notification when new information is available in [address] 9. Click Sign Up. You can now view and download portions of your medical record. 10. Click the Download Summary menu link to download a portable copy of your medical information. If you have questions, please visit the Frequently Asked Questions section of the Acorns website. Remember, Acorns is NOT to be used for urgent needs. For medical emergencies, dial 911. Now available from your iPhone and Android! Please provide this summary of care documentation to your next provider. Your primary care clinician is listed as Brando Hurst. If you have any questions after today's visit, please call [phone].
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
## 2018-05-29 ENCOUNTER — HOSPITAL ENCOUNTER (OUTPATIENT)
Dept: PHYSICAL THERAPY | Age: 33
Discharge: HOME | End: 2018-05-29
Payer: SELF-PAY

## 2018-05-29 PROCEDURE — 97110 THERAPEUTIC EXERCISES: CPT

## 2018-05-29 NOTE — PROGRESS NOTES
PHYSICAL THERAPY - DAILY TREATMENT NOTE    Patient Name: Bettie Garcia        Date: 2018  : 1985   YES Patient  Verified  Visit #:     Insurance: Payor: SELF PAY / Plan: [facility] SELF PAY / Product Type: Self Pay /      In time: 1301 Out time: 1205   Total Treatment Time: 60     Medicare Time Tracking (below)   Total Timed Codes (min):  60 1:1 Treatment Time:       TREATMENT AREA =  Chronic neck and back pain [M54.2, M54.9]    SUBJECTIVE  Pain Level (on 0 to 10 scale):   10   Medication Changes/New allergies or changes in medical history, any new surgeries or procedures? NO    If yes, update Summary List   Subjective Functional Status/Changes:  []  No changes reported     No new complaints. OBJECTIVE  60 min Therapeutic Exercise:  [x]  See flow sheet   Rationale:      increase ROM, increase strength and improve coordination to improve the patients ability to perform pain free ADLs. min Patient Education:  YES  Reviewed HEP   []  Progressed/Changed HEP based on: Other Objective/Functional Measures: Therex per flow sheet. Post Treatment Pain Level (on 0 to 10) scale:   3  / 10     ASSESSMENT  Assessment/Changes in Function:     No exacerbation of symptoms with today's session. []  See Progress Note/Recertification   Patient will continue to benefit from skilled PT services to modify and progress therapeutic interventions, address functional mobility deficits, address ROM deficits, address strength deficits, analyze and address soft tissue restrictions, analyze and cue movement patterns, analyze and modify body mechanics/ergonomics and assess and modify postural abnormalities to attain remaining goals. Progress toward goals / Updated goals:    No change in progress toward LTG's with today's session.        PLAN  [x]  Upgrade activities as tolerated YES Continue plan of care   []  Discharge due to :    []  Other:      Therapist: Rea Malcolm PTA Date: 5/29/2018 Time: 11:56 AM     Future Appointments  Date Time Provider Barry Ndiaye   5/31/2018 7:30 AM [facility] RM [facility]   5/31/2018 11:30 AM Uche Fitzpatrick, [facility] [address] [facility]

## 2018-05-31 ENCOUNTER — HOSPITAL ENCOUNTER (OUTPATIENT)
Dept: PHYSICAL THERAPY | Age: 33
Discharge: HOME | End: 2018-05-31
Payer: SELF-PAY

## 2018-05-31 ENCOUNTER — HOSPITAL ENCOUNTER (OUTPATIENT)
Dept: MRI IMAGING | Age: 33
Discharge: HOME | End: 2018-05-31
Attending: FAMILY MEDICINE
Payer: SELF-PAY

## 2018-05-31 DIAGNOSIS — M54.16 RIGHT LUMBAR RADICULOPATHY: ICD-10-CM

## 2018-05-31 PROCEDURE — 97110 THERAPEUTIC EXERCISES: CPT

## 2018-05-31 PROCEDURE — 72148 MRI LUMBAR SPINE W/O DYE: CPT

## 2018-05-31 PROCEDURE — 97140 MANUAL THERAPY 1/> REGIONS: CPT

## 2018-05-31 NOTE — PROGRESS NOTES
Please advise Mr. Arora that his MRI showed:  1. Central L5-S1 disc protrusion abutting descending S1 nerve roots without  evidence of mass effect. 2. 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 No focal disc herniations identified at these levels. 3. Bilateral retroperitoneal adenopathy. Nonspecific pelvic free fluid, abnormal  in this male patient. Does this patient have known underlying malignancy or  adenopathy elsewhere? Reportedly patient does have sarcoidosis which can be  associated with abdominal adenopathy in addition to classic hilar and  mediastinal adenopathy, however free fluid would not be expected. There are no  prior comparison studies. Clinical correlation is recommended. Dedicated CT  abdomen pelvis with IV contrast is suggested for further evaluation.       I can refer him to a physical medicine specialist in [place] to discuss what treatment options are available and recommended regarding the disc protrusion and mild central spinal canal stenosis- please ask if he would like to be referred. I would like him to review the other findings of retroperitoneal adenopathy and pelvic free fluid with the pulmonary medicine and gastroenterology consultants managing his sarcoidosis. Please find out how we can fax this report to them.

## 2018-05-31 NOTE — PROGRESS NOTES
PHYSICAL THERAPY - DAILY TREATMENT NOTE    Patient Name: Yusef Chapman        Date: 2018  : 1985   YES Patient  Verified  Visit #:     Insurance: Payor: SELF PAY / Plan: [facility] SELF PAY / Product Type: Self Pay /      In time: 1130 Out time: 8795   Total Treatment Time: 65     Medicare Time Tracking (below)   Total Timed Codes (min):  55 1:1 Treatment Time:       TREATMENT AREA =  Chronic neck and back pain [M54.2, M54.9]    SUBJECTIVE  Pain Level (on 0 to 10 scale):  4  / 10   Medication Changes/New allergies or changes in medical history, any new surgeries or procedures? NO    If yes, update Summary List   Subjective Functional Status/Changes:  []  No changes reported     Pt reporting getting an MRI earlier today. Will know results either tomorrow or Monday. Says he wants to avoid getting an injection if he can. OBJECTIVE  Modalities Rationale:     decrease pain and increase tissue extensibility to improve patient's ability to perform pain free ADLs. min [] Estim, type/location:                                      []  att     []  unatt     []  w/US     []  w/ice    []  w/heat    min []  Mechanical Traction: type/lbs                   []  pro   []  sup   []  int   []  cont    []  before manual    []  after manual    min []  Ultrasound, settings/location:      min []  Iontophoresis w/ dexamethasone, location:                                               []  take home patch       []  in clinic   10 min []  Ice     [x]  Heat    location/position: Prone, lumbar. min []  Vasopneumatic Device, press/temp:     min []  Other:    [x] Skin assessment post-treatment (if applicable):    [x]  intact    []  redness- no adverse reaction     []redness  adverse reaction:        45 min Therapeutic Exercise:  [x]  See flow sheet   Rationale:      increase ROM, increase strength and improve coordination to improve the patients ability to perform pain free ADLs.       10 min Manual Therapy: STM/DTM and TPR (B) lumbar and thoracic paraspinals, QL. Rationale:      decrease pain, increase ROM, increase tissue extensibility and decrease trigger points to improve patient's ability to perform pain free ADLs. min Patient Education:  YES  Reviewed HEP   []  Progressed/Changed HEP based on: Other Objective/Functional Measures: Therex per flow sheet. TTP (B) t/s paraspinals. Post Treatment Pain Level (on 0 to 10) scale:   3  / 10     ASSESSMENT  Assessment/Changes in Function:     No exacerbation of symptoms with today's session. []  See Progress Note/Recertification   Patient will continue to benefit from skilled PT services to modify and progress therapeutic interventions, address functional mobility deficits, address ROM deficits, address strength deficits, analyze and address soft tissue restrictions, analyze and cue movement patterns, analyze and modify body mechanics/ergonomics and assess and modify postural abnormalities to attain remaining goals. Progress toward goals / Updated goals:    No change in progress toward LTG's with today's session. PLAN  [x]  Upgrade activities as tolerated YES Continue plan of care   []  Discharge due to :    []  Other:      Therapist: Carissa Tucker PTA    Date: 5/31/2018 Time: 11:46 AM     No future appointments.

## 2018-06-01 DIAGNOSIS — M54.16 LUMBAR BACK PAIN WITH RADICULOPATHY AFFECTING RIGHT LOWER EXTREMITY: Primary | ICD-10-CM

## 2018-06-01 NOTE — PROGRESS NOTES
Pt aware of results and advice.  Pt states he would like to be referred to the physical medicine specialist. Also he stated he would come by the office and get a copy of his MRI results to take with him to the specialist

## 2018-06-04 ENCOUNTER — TELEPHONE (OUTPATIENT)
Dept: FAMILY MEDICINE CLINIC | Age: 33
End: 2018-06-04

## 2018-06-04 NOTE — TELEPHONE ENCOUNTER
[facility] was signed by Dr. Kiara Bradley and faxed back to [phone].  Confirmation received

## 2018-06-05 ENCOUNTER — APPOINTMENT (OUTPATIENT)
Dept: PHYSICAL THERAPY | Age: 33
End: 2018-06-05
Payer: SELF-PAY

## 2018-06-05 ENCOUNTER — OFFICE VISIT (OUTPATIENT)
Dept: ORTHOPEDIC SURGERY | Age: 33
End: 2018-06-05

## 2018-06-05 ENCOUNTER — DOCUMENTATION ONLY (OUTPATIENT)
Dept: ORTHOPEDIC SURGERY | Age: 33
End: 2018-06-05

## 2018-06-05 VITALS
HEART RATE: 119 BPM | HEIGHT: 72 IN | DIASTOLIC BLOOD PRESSURE: 82 MMHG | SYSTOLIC BLOOD PRESSURE: 115 MMHG | OXYGEN SATURATION: 97 % | BODY MASS INDEX: 22.81 KG/M2 | WEIGHT: 168.4 LBS | RESPIRATION RATE: 16 BRPM

## 2018-06-05 DIAGNOSIS — M51.9 ANNULAR TEAR: ICD-10-CM

## 2018-06-05 DIAGNOSIS — M79.18 MYOFASCIAL PAIN: Primary | ICD-10-CM

## 2018-06-05 DIAGNOSIS — F43.10 PTSD (POST-TRAUMATIC STRESS DISORDER): ICD-10-CM

## 2018-06-05 DIAGNOSIS — M46.1 SACROILIITIS (HCC): ICD-10-CM

## 2018-06-05 NOTE — PATIENT INSTRUCTIONS
Sacroiliac Joint Pain: Care Instructions  Your Care Instructions    The sacroiliac joints connect the spine and each side of the pelvis. These joints bear the weight and stress of your torso. This makes them easy to injure. Injury or overuse of these joints may cause low back pain. Stress on these joints can cause joint pain. Sacroiliac joint pain is more common in pregnant women. Certain kinds of arthritis also may cause this type of joint pain. Home treatment may help you feel better. So can avoiding activities that stress your back. Your doctor also may recommend physical therapy. This may include doing exercises and stretches to help with pain. You may also learn to use good posture. Follow-up care is a key part of your treatment and safety. Be sure to make and go to all appointments, and call your doctor if you are having problems. It's also a good idea to know your test results and keep a list of the medicines you take. How can you care for yourself at home? · Ask your doctor about light exercises that may help your back pain. Try to do light activity throughout the day. But make sure to take rests as needed. Find a comfortable position for rest, but don't stay in one position for too long. Avoid activities that cause pain. · To apply heat, put a warm water bottle, a heating pad set on low, or a warm cloth on your back. Do not go to sleep with a heating pad on your skin. · Put ice or a cold pack on your back for 10 to 20 minutes at a time. Put a thin cloth between the ice and your skin. · If the doctor gave you a prescription medicine for pain, take it as prescribed. · If you are not taking a prescription pain medicine, ask your doctor if you can take an over-the-counter pain medicine, such as acetaminophen (Tylenol), ibuprofen (Advil, Motrin), or naproxen (Aleve). Read and follow all instructions on the label. Take pain medicines exactly as directed.   · Do not take two or more pain medicines at the same time unless the doctor told you to. Many pain medicines have acetaminophen, which is Tylenol. Too much acetaminophen (Tylenol) can be harmful. · To prevent future back pain, do exercises to stretch and strengthen your back and stomach. Learn how to use good posture, safe lifting techniques, and proper body mechanics. When should you call for help? Call 911 anytime you think you may need emergency care. For example, call if:  ? · You are unable to move a leg at all. ?Call your doctor now or seek immediate medical care if:  ? · You have new or worse symptoms in your legs or buttocks. Symptoms may include:  ¨ Numbness or tingling. ¨ Weakness. ¨ Pain. ? · You lose bladder or bowel control. ? Watch closely for changes in your health, and be sure to contact your doctor if:  ? · You are not getting better as expected. Where can you learn more? Go to http://alvaroFlatBurgerdianne.info/. Enter U385 in the search box to learn more about \"Sacroiliac Joint Pain: Care Instructions. \"  Current as of: March 21, 2017  Content Version: 11.4  © 7646-0319 [employer]. Care instructions adapted under license by [email or web address] (which disclaims liability or warranty for this information). If you have questions about a medical condition or this instruction, always ask your healthcare professional. [address] any warranty or liability for your use of this information. Sacroiliac Pain: Exercises  Your Care Instructions  Here are some examples of typical rehabilitation exercises for your condition. Start each exercise slowly. Ease off the exercise if you start to have pain. Your doctor or physical therapist will tell you when you can start these exercises and which ones will work best for you. How to do the exercises  Knee-to-chest stretch    1. Do not do the knee-to-chest exercise if it causes or increases back or leg pain.   2. Lie on your back with your knees bent and your feet flat on the floor. You can put a small pillow under your head and neck if it is more comfortable. 3. Grasp your hands under one knee and bring the knee to your chest, keeping the other foot flat on the floor. 4. Keep your lower back pressed to the floor. Hold for at least 15 to 30 seconds. 5. Relax and lower the knee to the starting position. Repeat with the other leg. 6. Repeat 2 to 4 times with each leg. 7. To get more stretch, keep your other leg flat on the floor while pulling your knee to your chest.  Bridging    1. Lie on your back with both knees bent. Your knees should be bent about 90 degrees. 2. Tighten your belly muscles by pulling in your belly button toward your spine. Then push your feet into the floor, squeeze your buttocks, and lift your hips off the floor until your shoulders, hips, and knees are all in a straight line. 3. Hold for about 6 seconds as you continue to breathe normally, and then slowly lower your hips back down to the floor and rest for up to 10 seconds. 4. Repeat 8 to 12 times. Hip extension    1. Get down on your hands and knees on the floor. 2. Keeping your back and neck straight, lift one leg straight out behind you. When you lift your leg, keep your hips level. Don't let your back twist, and don't let your hip drop toward the floor. 3. Hold for 6 seconds. Repeat 8 to 12 times with each leg. 4. If you feel steady and strong when you do this exercise, you can make it more difficult. To do this, when you lift your leg, also lift the opposite arm straight out in front of you. For example, lift the left leg and the right arm at the same time. (This is sometimes called the \"bird dog exercise. \") Hold for 6 seconds, and repeat 8 to 12 times on each side. Clamshell    1. Lie on your side with a pillow under your head. Keep your feet and knees together and your knees bent. 2. Raise your top knee, but keep your feet together.  Do not let your hips roll back. Your legs should open up like a clamshell. 3. Hold for 6 seconds. 4. Slowly lower your knee back down. Rest for 10 seconds. 5. Repeat 8 to 12 times. 6. Switch to your other side and repeat steps 1 through 5. Hamstring wall stretch    1. Lie on your back in a doorway, with one leg through the open door. 2. Slide your affected leg up the wall to straighten your knee. You should feel a gentle stretch down the back of your leg. 1. Do not arch your back. 2. Do not bend either knee. 3. Keep one heel touching the floor and the other heel touching the wall. Do not point your toes. 3. Hold the stretch for at least 1 minute to begin. Then try to lengthen the time you hold the stretch to as long as 6 minutes. 4. Switch legs, and repeat steps 1 through 3.  5. Repeat 2 to 4 times. 6. If you do not have a place to do this exercise in a doorway, there is another way to do it:  7. Lie on your back, and bend one knee. 8. Loop a towel under the ball and toes of that foot, and hold the ends of the towel in your hands. 9. Straighten your knee, and slowly pull back on the towel. You should feel a gentle stretch down the back of your leg. 10. Switch legs, and repeat steps 1 through 3.  11. Repeat 2 to 4 times. Lower abdominal strengthening    1. Lie on your back with your knees bent and your feet flat on the floor. 2. Tighten your belly muscles by pulling your belly button in toward your spine. 3. Lift one foot off the floor and bring your knee toward your chest, so that your knee is straight above your hip and your leg is bent like the letter \"L. \"  4. Lift the other knee up to the same position. 5. Lower one leg at a time to the starting position. 6. Keep alternating legs until you have lifted each leg 8 to 12 times. 7. Be sure to keep your belly muscles tight and your back still as you are moving your legs. Be sure to breathe normally. Piriformis stretch    1.  Lie on your back with your legs straight. 2. Lift your affected leg, and bend your knee. With your opposite hand, reach across your body, and then gently pull your knee toward your opposite shoulder. 3. Hold the stretch for 15 to 30 seconds. 4. Switch legs and repeat steps 1 through 3.  5. Repeat 2 to 4 times. Follow-up care is a key part of your treatment and safety. Be sure to make and go to all appointments, and call your doctor if you are having problems. It's also a good idea to know your test results and keep a list of the medicines you take. Where can you learn more? Go to http://alvaro-dianne.info/. Enter U988 in the search box to learn more about \"Sacroiliac Pain: Exercises. \"  Current as of: March 21, 2017  Content Version: 11.4  © 1180-4614 [employer]. Care instructions adapted under license by [employer] (which disclaims liability or warranty for this information). If you have questions about a medical condition or this instruction, always ask your healthcare professional. [place] [address] any warranty or liability for your use of this information. Back Stretches: Exercises  Your Care Instructions  Here are some examples of exercises for stretching your back. Start each exercise slowly. Ease off the exercise if you start to have pain. Your doctor or physical therapist will tell you when you can start these exercises and which ones will work best for you. How to do the exercises  Overhead stretch    8. Stand comfortably with your feet shoulder-width apart. 9. Looking straight ahead, raise both arms over your head and reach toward the ceiling. Do not allow your head to tilt back. 10. Hold for 15 to 30 seconds, then lower your arms to your sides. 11. Repeat 2 to 4 times. Side stretch    5. Stand comfortably with your feet shoulder-width apart. 6. Raise one arm over your head, and then lean to the other side.   7. Slide your hand down your leg as you let the weight of your arm gently stretch your side muscles. Hold for 15 to 30 seconds. 8. Repeat 2 to 4 times on each side. Press-up    5. Lie on your stomach, supporting your body with your forearms. 6. Press your elbows down into the floor to raise your upper back. As you do this, relax your stomach muscles and allow your back to arch without using your back muscles. As your press up, do not let your hips or pelvis come off the floor. 7. Hold for 15 to 30 seconds, then relax. 8. Repeat 2 to 4 times. Relax and rest    7. Lie on your back with a rolled towel under your neck and a pillow under your knees. Extend your arms comfortably to your sides. 8. Relax and breathe normally. 9. Remain in this position for about 10 minutes. 10. If you can, do this 2 or 3 times each day. Follow-up care is a key part of your treatment and safety. Be sure to make and go to all appointments, and call your doctor if you are having problems. It's also a good idea to know your test results and keep a list of the medicines you take. Where can you learn more? Go to http://alvaro-dianne.info/. Enter A771 in the search box to learn more about \"Back Stretches: Exercises. \"  Current as of: March 21, 2017  Content Version: 11.4  © 0750-5845 [employer]. Care instructions adapted under license by [employer] (which disclaims liability or warranty for this information). If you have questions about a medical condition or this instruction, always ask your healthcare professional. [place] [address] any warranty or liability for your use of this information. Low Back Pain: Exercises  Your Care Instructions  Here are some examples of typical rehabilitation exercises for your condition. Start each exercise slowly. Ease off the exercise if you start to have pain.   Your doctor or physical therapist will tell you when you can start these exercises and which ones will work best for you.  How to do the exercises  Press-up    12. Lie on your stomach, supporting your body with your forearms. 13. Press your elbows down into the floor to raise your upper back. As you do this, relax your stomach muscles and allow your back to arch without using your back muscles. As your press up, do not let your hips or pelvis come off the floor. 14. Hold for 15 to 30 seconds, then relax. 15. Repeat 2 to 4 times. Alternate arm and leg (bird dog) exercise    Do this exercise slowly. Try to keep your body straight at all times, and do not let one hip drop lower than the other. 9. Start on the floor, on your hands and knees. 10. Tighten your belly muscles. 11. Raise one leg off the floor, and hold it straight out behind you. Be careful not to let your hip drop down, because that will twist your trunk. 12. Hold for about 6 seconds, then lower your leg and switch to the other leg. 13. Repeat 8 to 12 times on each leg. 14. Over time, work up to holding for 10 to 30 seconds each time. 15. If you feel stable and secure with your leg raised, try raising the opposite arm straight out in front of you at the same time. Knee-to-chest exercise    9. Lie on your back with your knees bent and your feet flat on the floor. 10. Bring one knee to your chest, keeping the other foot flat on the floor (or keeping the other leg straight, whichever feels better on your lower back). 11. Keep your lower back pressed to the floor. Hold for at least 15 to 30 seconds. 12. Relax, and lower the knee to the starting position. 13. Repeat with the other leg. Repeat 2 to 4 times with each leg. 14. To get more stretch, put your other leg flat on the floor while pulling your knee to your chest.  Curl-ups    11. Lie on the floor on your back with your knees bent at a 90-degree angle. Your feet should be flat on the floor, about 12 inches from your buttocks.   12. Cross your arms over your chest. If this bothers your neck, try putting your hands behind your neck (not your head), with your elbows spread apart. 13. Slowly tighten your belly muscles and raise your shoulder blades off the floor. 14. Keep your head in line with your body, and do not press your chin to your chest.  15. Hold this position for 1 or 2 seconds, then slowly lower yourself back down to the floor. 16. Repeat 8 to 12 times. Pelvic tilt exercise    12. Lie on your back with your knees bent. 13. \"Brace\" your stomach. This means to tighten your muscles by pulling in and imagining your belly button moving toward your spine. You should feel like your back is pressing to the floor and your hips and pelvis are rocking back. 14. Hold for about 6 seconds while you breathe smoothly. 15. Repeat 8 to 12 times. Heel dig bridging    8. Lie on your back with both knees bent and your ankles bent so that only your heels are digging into the floor. Your knees should be bent about 90 degrees. 9. Then push your heels into the floor, squeeze your buttocks, and lift your hips off the floor until your shoulders, hips, and knees are all in a straight line. 10. Hold for about 6 seconds as you continue to breathe normally, and then slowly lower your hips back down to the floor and rest for up to 10 seconds. 11. Do 8 to 12 repetitions. Hamstring stretch in doorway    6. Lie on your back in a doorway, with one leg through the open door. 7. Slide your leg up the wall to straighten your knee. You should feel a gentle stretch down the back of your leg. 8. Hold the stretch for at least 15 to 30 seconds. Do not arch your back, point your toes, or bend either knee. Keep one heel touching the floor and the other heel touching the wall. 9. Repeat with your other leg. 10. Do 2 to 4 times for each leg. Hip flexor stretch    1. Kneel on the floor with one knee bent and one leg behind you. Place your forward knee over your foot. Keep your other knee touching the floor.   2. Slowly push your hips forward until you feel a stretch in the upper thigh of your rear leg. 3. Hold the stretch for at least 15 to 30 seconds. Repeat with your other leg. 4. Do 2 to 4 times on each side. Wall sit    1. Stand with your back 10 to 12 inches away from a wall. 2. Lean into the wall until your back is flat against it. 3. Slowly slide down until your knees are slightly bent, pressing your lower back into the wall. 4. Hold for about 6 seconds, then slide back up the wall. 5. Repeat 8 to 12 times. Follow-up care is a key part of your treatment and safety. Be sure to make and go to all appointments, and call your doctor if you are having problems. It's also a good idea to know your test results and keep a list of the medicines you take. Where can you learn more? Go to http://alvaro-dianne.info/. Enter P730 in the search box to learn more about \"Low Back Pain: Exercises. \"  Current as of: March 21, 2017  Content Version: 11.4  © 7906-2757 [employer]. Care instructions adapted under license by [employer] (which disclaims liability or warranty for this information). If you have questions about a medical condition or this instruction, always ask your healthcare professional. [address] any warranty or liability for your use of this information.

## 2018-06-05 NOTE — MR AVS SNAPSHOT
[facility] [address] [place] [address] 
[phone] Patient: Coleen Curry MRN: [id number] QRI:[id number] Visit Information Date & Time Provider Department Dept. Phone Encounter #  
 6/5/2018 10:00 AM Antony Palacios MD [facility], located within [facility] [phone] [id number] Follow-up Instructions Return in about 3 weeks (around 6/26/2018). Upcoming Health Maintenance Date Due DTaP/Tdap/Td series (1 - Tdap) 4/7/2004 Influenza Age 5 to Adult 8/1/2018 Allergies as of 6/5/2018  Review Complete On: 6/5/2018 By: Yash Wilcox LPN No Known Allergies Current Immunizations  Never Reviewed No immunizations on file. Not reviewed this visit You Were Diagnosed With   
  
 Codes Comments Myofascial pain    -  Primary ICD-10-CM: M79.1 ICD-9-CM: 729.1 Sacroiliitis ([facility] [address]     ICD-10-CM: M46.1 ICD-9-CM: 720.2 Annular tear     ICD-10-CM: M51.9 ICD-9-CM: 722.90 PTSD (post-traumatic stress disorder)     ICD-10-CM: F43.10 ICD-9-CM: 309.81 Vitals BP Pulse Resp Height(growth percentile) Weight(growth percentile) SpO2  
 115/82 (!) 119 16 6' (1.829 m) 168 lb 6.4 oz (76.4 kg) 97% BMI Smoking Status 22.84 kg/m2 Never Smoker BMI and BSA Data Body Mass Index Body Surface Area  
 22.84 kg/m 2 1.97 m 2 Your Updated Medication List  
  
Notice  As of 6/5/2018 11:23 AM  
 You have not been prescribed any medications. We Performed the Following REFERRAL TO PHYSICAL THERAPY [IIH34 Custom] Comments:  
 Karina Mendenhall Continue treatment of lumbar myofascial pain Please include treatment for bilateral sacroiliitis Include dry needling protocol REFERRAL TO PSYCHOLOGY [WBN66 Custom] SCHEDULE SURGERY [UUU1479 Custom] Follow-up Instructions Return in about 3 weeks (around 6/26/2018). To-Do List   
 06/08/2018 10:00 AM  
  Appointment with Dina Garcia, PT at [address] [facility] ([phone])  
  
 06/13/2018 10:00 AM  
  Appointment with Felicitas Doherty PTA at [address] [facility] ([phone])  
  
 06/15/2018 10:00 AM  
  Appointment with Dina Garcia, PT at [address] [facility] ([phone])  
  
 06/19/2018 11:30 AM  
  Appointment with Dina Garcia, PT at [address] ([phone])  
  
 06/22/2018 10:00 AM  
  Appointment with Ebanjelica Garcia, PT at [address] ([phone])  
  
 06/26/2018 10:00 AM  
  Appointment with Dina Garcia, PT at [address] ([phone])  
  
 06/28/2018 10:00 AM  
  Appointment with Dina Garcia PT at [address] [facility] ([phone]) Referral Information Referral ID Referred By Referred To  
  
 [id number] [facility]   
   [address], [address] [place], [address] [place] [address] Phone: [phone] Visits Status Start Date End Date 1 New Request 6/5/18 6/5/19 If your referral has a status of pending review or denied, additional information will be sent to support the outcome of this decision. Referral ID Referred By Referred To  
 [id number] [facility] AT [address], PHD  
   [facility], [address] [address] Phone: [phone] Fax: [phone] Visits Status Start Date End Date 1 New Request 6/5/18 6/5/19 If your referral has a status of pending review or denied, additional information will be sent to support the outcome of this decision. Patient Instructions Sacroiliac Joint Pain: Care Instructions Your Care Instructions The sacroiliac joints connect the spine and each side of the pelvis. These joints bear the weight and stress of your torso. This makes them easy to injure. Injury or overuse of these joints may cause low back pain. Stress on these joints can cause joint pain. Sacroiliac joint pain is more common in pregnant women. Certain kinds of arthritis also may cause this type of joint pain. Home treatment may help you feel better. So can avoiding activities that stress your back. Your doctor also may recommend physical therapy. This may include doing exercises and stretches to help with pain. You may also learn to use good posture. Follow-up care is a key part of your treatment and safety. Be sure to make and go to all appointments, and call your doctor if you are having problems. It's also a good idea to know your test results and keep a list of the medicines you take. How can you care for yourself at home? · Ask your doctor about light exercises that may help your back pain. Try to do light activity throughout the day. But make sure to take rests as needed. Find a comfortable position for rest, but don't stay in one position for too long. Avoid activities that cause pain. · To apply heat, put a warm water bottle, a heating pad set on low, or a warm cloth on your back. Do not go to sleep with a heating pad on your skin. · Put ice or a cold pack on your back for 10 to 20 minutes at a time. Put a thin cloth between the ice and your skin. · If the doctor gave you a prescription medicine for pain, take it as prescribed. · If you are not taking a prescription pain medicine, ask your doctor if you can take an over-the-counter pain medicine, such as acetaminophen (Tylenol), ibuprofen (Advil, Motrin), or naproxen (Aleve). Read and follow all instructions on the label. Take pain medicines exactly as directed. · Do not take two or more pain medicines at the same time unless the doctor told you to. Many pain medicines have acetaminophen, which is Tylenol. Too much acetaminophen (Tylenol) can be harmful.  
· To prevent future back pain, do exercises to stretch and strengthen your back and stomach. Learn how to use good posture, safe lifting techniques, and proper body mechanics. When should you call for help? Call 911 anytime you think you may need emergency care. For example, call if: 
? · You are unable to move a leg at all. ?Call your doctor now or seek immediate medical care if: 
? · You have new or worse symptoms in your legs or buttocks. Symptoms may include: ¨ Numbness or tingling. ¨ Weakness. ¨ Pain. ? · You lose bladder or bowel control. ? Watch closely for changes in your health, and be sure to contact your doctor if: 
? · You are not getting better as expected. Where can you learn more? Go to http://alvaroi.TVdianne.info/. Enter Q464 in the search box to learn more about \"Sacroiliac Joint Pain: Care Instructions. \" Current as of: March 21, 2017 Content Version: 11.4 © 1957-7410 [employer]. Care instructions adapted under license by [employer] (which disclaims liability or warranty for this information). If you have questions about a medical condition or this instruction, always ask your healthcare professional. [place] [address] any warranty or liability for your use of this information. Sacroiliac Pain: Exercises Your Care Instructions Here are some examples of typical rehabilitation exercises for your condition. Start each exercise slowly. Ease off the exercise if you start to have pain. Your doctor or physical therapist will tell you when you can start these exercises and which ones will work best for you. How to do the exercises Knee-to-chest stretch 1. Do not do the knee-to-chest exercise if it causes or increases back or leg pain. 2. Lie on your back with your knees bent and your feet flat on the floor. You can put a small pillow under your head and neck if it is more comfortable.  
3. Grasp your hands under one knee and bring the knee to your chest, keeping the other foot flat on the floor. 4. Keep your lower back pressed to the floor. Hold for at least 15 to 30 seconds. 5. Relax and lower the knee to the starting position. Repeat with the other leg. 6. Repeat 2 to 4 times with each leg. 7. To get more stretch, keep your other leg flat on the floor while pulling your knee to your chest. 
Bridging 1. Lie on your back with both knees bent. Your knees should be bent about 90 degrees. 2. Tighten your belly muscles by pulling in your belly button toward your spine. Then push your feet into the floor, squeeze your buttocks, and lift your hips off the floor until your shoulders, hips, and knees are all in a straight line. 3. Hold for about 6 seconds as you continue to breathe normally, and then slowly lower your hips back down to the floor and rest for up to 10 seconds. 4. Repeat 8 to 12 times. Hip extension 1. Get down on your hands and knees on the floor. 2. Keeping your back and neck straight, lift one leg straight out behind you. When you lift your leg, keep your hips level. Don't let your back twist, and don't let your hip drop toward the floor. 3. Hold for 6 seconds. Repeat 8 to 12 times with each leg. 4. If you feel steady and strong when you do this exercise, you can make it more difficult. To do this, when you lift your leg, also lift the opposite arm straight out in front of you. For example, lift the left leg and the right arm at the same time. (This is sometimes called the \"bird dog exercise. \") Hold for 6 seconds, and repeat 8 to 12 times on each side. Clamshell 1. Lie on your side with a pillow under your head. Keep your feet and knees together and your knees bent. 2. Raise your top knee, but keep your feet together. Do not let your hips roll back. Your legs should open up like a clamshell. 3. Hold for 6 seconds. 4. Slowly lower your knee back down. Rest for 10 seconds. 5. Repeat 8 to 12 times. 6. Switch to your other side and repeat steps 1 through 5. Hamstring wall stretch 1. Lie on your back in a doorway, with one leg through the open door. 2. Slide your affected leg up the wall to straighten your knee. You should feel a gentle stretch down the back of your leg. 1. Do not arch your back. 2. Do not bend either knee. 3. Keep one heel touching the floor and the other heel touching the wall. Do not point your toes. 3. Hold the stretch for at least 1 minute to begin. Then try to lengthen the time you hold the stretch to as long as 6 minutes. 4. Switch legs, and repeat steps 1 through 3. 
5. Repeat 2 to 4 times. 6. If you do not have a place to do this exercise in a doorway, there is another way to do it: 
7. Lie on your back, and bend one knee. 8. Loop a towel under the ball and toes of that foot, and hold the ends of the towel in your hands. 9. Straighten your knee, and slowly pull back on the towel. You should feel a gentle stretch down the back of your leg. 10. Switch legs, and repeat steps 1 through 3. 
11. Repeat 2 to 4 times. Lower abdominal strengthening 1. Lie on your back with your knees bent and your feet flat on the floor. 2. Tighten your belly muscles by pulling your belly button in toward your spine. 3. Lift one foot off the floor and bring your knee toward your chest, so that your knee is straight above your hip and your leg is bent like the letter \"L. \" 
4. Lift the other knee up to the same position. 5. Lower one leg at a time to the starting position. 6. Keep alternating legs until you have lifted each leg 8 to 12 times. 7. Be sure to keep your belly muscles tight and your back still as you are moving your legs. Be sure to breathe normally. Piriformis stretch 1. Lie on your back with your legs straight. 2. Lift your affected leg, and bend your knee. With your opposite hand, reach across your body, and then gently pull your knee toward your opposite shoulder. 3. Hold the stretch for 15 to 30 seconds. 4. Switch legs and repeat steps 1 through 3. 
5. Repeat 2 to 4 times. Follow-up care is a key part of your treatment and safety. Be sure to make and go to all appointments, and call your doctor if you are having problems. It's also a good idea to know your test results and keep a list of the medicines you take. Where can you learn more? Go to http://alvaro-dianne.info/. Enter B398 in the search box to learn more about \"Sacroiliac Pain: Exercises. \" Current as of: March 21, 2017 Content Version: 11.4 © 2754-7353 [employer]. Care instructions adapted under license by [employer] (which disclaims liability or warranty for this information). If you have questions about a medical condition or this instruction, always ask your healthcare professional. [place] [address] any warranty or liability for your use of this information. Back Stretches: Exercises Your Care Instructions Here are some examples of exercises for stretching your back. Start each exercise slowly. Ease off the exercise if you start to have pain. Your doctor or physical therapist will tell you when you can start these exercises and which ones will work best for you. How to do the exercises Overhead stretch 8. Stand comfortably with your feet shoulder-width apart. 9. Looking straight ahead, raise both arms over your head and reach toward the ceiling. Do not allow your head to tilt back. 10. Hold for 15 to 30 seconds, then lower your arms to your sides. 11. Repeat 2 to 4 times. Side stretch 5. Stand comfortably with your feet shoulder-width apart. 6. Raise one arm over your head, and then lean to the other side. 7. Slide your hand down your leg as you let the weight of your arm gently stretch your side muscles. Hold for 15 to 30 seconds. 8. Repeat 2 to 4 times on each side. Press-up 5. Lie on your stomach, supporting your body with your forearms. 6. Press your elbows down into the floor to raise your upper back. As you do this, relax your stomach muscles and allow your back to arch without using your back muscles. As your press up, do not let your hips or pelvis come off the floor. 7. Hold for 15 to 30 seconds, then relax. 8. Repeat 2 to 4 times. Relax and rest 
 
7. Lie on your back with a rolled towel under your neck and a pillow under your knees. Extend your arms comfortably to your sides. 8. Relax and breathe normally. 9. Remain in this position for about 10 minutes. 10. If you can, do this 2 or 3 times each day. Follow-up care is a key part of your treatment and safety. Be sure to make and go to all appointments, and call your doctor if you are having problems. It's also a good idea to know your test results and keep a list of the medicines you take. Where can you learn more? Go to http://alvaro-dianne.info/. Enter Y764 in the search box to learn more about \"Back Stretches: Exercises. \" Current as of: March 21, 2017 Content Version: 11.4 © 8329-0879 [employer]. Care instructions adapted under license by [employer] (which disclaims liability or warranty for this information). If you have questions about a medical condition or this instruction, always ask your healthcare professional. [place] [address] any warranty or liability for your use of this information. Low Back Pain: Exercises Your Care Instructions Here are some examples of typical rehabilitation exercises for your condition. Start each exercise slowly. Ease off the exercise if you start to have pain. Your doctor or physical therapist will tell you when you can start these exercises and which ones will work best for you. How to do the exercises Press-up 12. Lie on your stomach, supporting your body with your forearms. 13. Press your elbows down into the floor to raise your upper back. As you do this, relax your stomach muscles and allow your back to arch without using your back muscles. As your press up, do not let your hips or pelvis come off the floor. 14. Hold for 15 to 30 seconds, then relax. 15. Repeat 2 to 4 times. Alternate arm and leg (bird dog) exercise Do this exercise slowly. Try to keep your body straight at all times, and do not let one hip drop lower than the other. 9. Start on the floor, on your hands and knees. 10. Tighten your belly muscles. 11. Raise one leg off the floor, and hold it straight out behind you. Be careful not to let your hip drop down, because that will twist your trunk. 12. Hold for about 6 seconds, then lower your leg and switch to the other leg. 13. Repeat 8 to 12 times on each leg. 14. Over time, work up to holding for 10 to 30 seconds each time. 15. If you feel stable and secure with your leg raised, try raising the opposite arm straight out in front of you at the same time. Knee-to-chest exercise 9. Lie on your back with your knees bent and your feet flat on the floor. 10. Bring one knee to your chest, keeping the other foot flat on the floor (or keeping the other leg straight, whichever feels better on your lower back). 11. Keep your lower back pressed to the floor. Hold for at least 15 to 30 seconds. 12. Relax, and lower the knee to the starting position. 13. Repeat with the other leg. Repeat 2 to 4 times with each leg. 14. To get more stretch, put your other leg flat on the floor while pulling your knee to your chest. 
Curl-ups 11. Lie on the floor on your back with your knees bent at a 90-degree angle. Your feet should be flat on the floor, about 12 inches from your buttocks. 12. Cross your arms over your chest. If this bothers your neck, try putting your hands behind your neck (not your head), with your elbows spread apart. 13. Slowly tighten your belly muscles and raise your shoulder blades off the floor. 14. Keep your head in line with your body, and do not press your chin to your chest. 
15. Hold this position for 1 or 2 seconds, then slowly lower yourself back down to the floor. 16. Repeat 8 to 12 times. Pelvic tilt exercise 12. Lie on your back with your knees bent. 13. \"Brace\" your stomach. This means to tighten your muscles by pulling in and imagining your belly button moving toward your spine. You should feel like your back is pressing to the floor and your hips and pelvis are rocking back. 14. Hold for about 6 seconds while you breathe smoothly. 15. Repeat 8 to 12 times. Heel dig bridging 8. Lie on your back with both knees bent and your ankles bent so that only your heels are digging into the floor. Your knees should be bent about 90 degrees. 9. Then push your heels into the floor, squeeze your buttocks, and lift your hips off the floor until your shoulders, hips, and knees are all in a straight line. 10. Hold for about 6 seconds as you continue to breathe normally, and then slowly lower your hips back down to the floor and rest for up to 10 seconds. 11. Do 8 to 12 repetitions. Hamstring stretch in doorway 6. Lie on your back in a doorway, with one leg through the open door. 7. Slide your leg up the wall to straighten your knee. You should feel a gentle stretch down the back of your leg. 8. Hold the stretch for at least 15 to 30 seconds. Do not arch your back, point your toes, or bend either knee. Keep one heel touching the floor and the other heel touching the wall. 9. Repeat with your other leg. 10. Do 2 to 4 times for each leg. Hip flexor stretch 1. Kneel on the floor with one knee bent and one leg behind you. Place your forward knee over your foot. Keep your other knee touching the floor. 2. Slowly push your hips forward until you feel a stretch in the upper thigh of your rear leg. 3. Hold the stretch for at least 15 to 30 seconds. Repeat with your other leg. 4. Do 2 to 4 times on each side. Wall sit 1. Stand with your back 10 to 12 inches away from a wall. 2. Lean into the wall until your back is flat against it. 3. Slowly slide down until your knees are slightly bent, pressing your lower back into the wall. 4. Hold for about 6 seconds, then slide back up the wall. 5. Repeat 8 to 12 times. Follow-up care is a key part of your treatment and safety. Be sure to make and go to all appointments, and call your doctor if you are having problems. It's also a good idea to know your test results and keep a list of the medicines you take. Where can you learn more? Go to http://alvaro-dianne.info/. Enter I071 in the search box to learn more about \"Low Back Pain: Exercises. \" Current as of: March 21, 2017 Content Version: 11.4 © 5861-0508 [employer]. Care instructions adapted under license by [employer] (which disclaims liability or warranty for this information). If you have questions about a medical condition or this instruction, always ask your healthcare professional. [address] any warranty or liability for your use of this information. Introducing [facility]! Josy Glynn introduces Beeline patient portal. Now you can access parts of your medical record, email your doctor's office, and request medication refills online. 1. In your internet browser, go to [email or web address] 2. Click on the First Time User? Click Here link in the Sign In box. You will see the New Member Sign Up page. 3. Enter your Beeline Access Code exactly as it appears below. You will not need to use this code after youve completed the sign-up process. If you do not sign up before the expiration date, you must request a new code. · Beeline Access Code: [email or web address] Expires: 7/15/2018  3:12 PM 
 
 4. Enter the last four digits of your Social Security Number (xxxx) and Date of Birth (mm/dd/yyyy) as indicated and click Submit. You will be taken to the next sign-up page. 5. Create a TakeLessons ID. This will be your TakeLessons login ID and cannot be changed, so think of one that is secure and easy to remember. 6. Create a TakeLessons password. You can change your password at any time. 7. Enter your Password Reset Question and Answer. This can be used at a later time if you forget your password. 8. Enter your e-mail address. You will receive e-mail notification when new information is available in [address] 9. Click Sign Up. You can now view and download portions of your medical record. 10. Click the Download Summary menu link to download a portable copy of your medical information. If you have questions, please visit the Frequently Asked Questions section of the TakeLessons website. Remember, TakeLessons is NOT to be used for urgent needs. For medical emergencies, dial 911. Now available from your iPhone and Android! Please provide this summary of care documentation to your next provider. Your primary care clinician is listed as Srinivas Richards. If you have any questions after today's visit, please call [phone].

## 2018-06-05 NOTE — PROGRESS NOTES
Mr. Brittney Garnett and I discussed scheduling the injection ordered by Dr. Evita Demarco. It was decided he would take an application for financial assistance and bring it back and we would schedule at that time.
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
## 2018-06-08 ENCOUNTER — HOSPITAL ENCOUNTER (OUTPATIENT)
Dept: PHYSICAL THERAPY | Age: 33
Discharge: HOME | End: 2018-06-08
Payer: SELF-PAY

## 2018-06-08 PROCEDURE — 97140 MANUAL THERAPY 1/> REGIONS: CPT

## 2018-06-08 PROCEDURE — 97014 ELECTRIC STIMULATION THERAPY: CPT

## 2018-06-08 PROCEDURE — 97110 THERAPEUTIC EXERCISES: CPT

## 2018-06-08 NOTE — PROGRESS NOTES
PHYSICAL THERAPY - DAILY TREATMENT NOTE    Patient Name: Iris Almanza        Date: 2018  : 1985   YES Patient  Verified  Visit #:     Insurance: Payor: SELF PAY / Plan: [employer] / Product Type: Self Pay /      In time: 10:00 Out time: 11:06   Total Treatment Time: 66     Medicare Time Tracking (below)   Total Timed Codes (min):  na 1:1 Treatment Time:  na     TREATMENT AREA =  Chronic neck and back pain [M54.2, M54.9]    SUBJECTIVE  Pain Level (on 0 to 10 scale):  5  / 10   Medication Changes/New allergies or changes in medical history, any new surgeries or procedures? NO    If yes, update Summary List   Subjective Functional Status/Changes:  []  No changes reported     MD said MRI showed bulging disc. Waiting on injection            OBJECTIVE                                   10 min [x] Estim, type/location:                                  GOX                                    []  att     [x]  unatt     []  w/US     []  w/ice    [x]  w/heat      min []  Mechanical Traction: type/lbs                     []  pro   []  sup   []  int   []  cont    []  before manual    []  after manual      min []  Ultrasound, settings/location:          min []  Iontophoresis w/ dexamethasone, location:                                                 []  take home patch       []  in clinic      min []  Ice     []  Heat    location/position:         min []  Vasopneumatic Device, press/temp:         min []  Other:      [] Skin assessment post-treatment (if applicable):    []  intact    []  redness- no adverse reaction     []redness  adverse reaction:      30 min Therapeutic Exercise:  [x]  See flow sheet   Rationale:      increase ROM, increase strength and improve coordination to improve the patients ability to perform pain free ADLs.         20 min Manual Therapy: STM/DTM  lumbar and thoracic paraspinals, QL glute/piriformis.     Rationale:      decrease pain, increase ROM, increase tissue extensibility and decrease trigger points to improve patient's ability to perform pain free ADLs.        min Patient Education:  YES  Reviewed HEP   []  Progressed/Changed HEP based on: Other Objective/Functional Measures:    Cont to have increased soft tissue tension at L para     Post Treatment Pain Level (on 0 to 10) scale:   3  / 10     ASSESSMENT  Assessment/Changes in Function:     Good ratna to all Rx without increase in pain      []  See Progress Note/Recertification   Patient will continue to benefit from skilled PT services to modify and progress therapeutic interventions, address functional mobility deficits, address ROM deficits, address strength deficits, analyze and address soft tissue restrictions, analyze and cue movement patterns, analyze and modify body mechanics/ergonomics and assess and modify postural abnormalities to attain remaining goals.    Progress toward goals / Updated goals:    Slow progress with symptom reduction      PLAN  []  Upgrade activities as tolerated YES Continue plan of care   []  Discharge due to :    []  Other:      Therapist: Lala Meeks, PT, OCS, SCS, CSCS    Date: 6/8/2018 Time: 6:32 AM       Future Appointments  Date Time Provider Barry Ndiaye   6/8/2018 10:00 AM Rolando Barbosa, PT [address] [place] [address]   6/13/2018 10:00 AM Carissa Tucker, YAMILKA [facility]   6/15/2018 10:00 AM Rolando Barbosa PT [facility]   6/19/2018 11:30 AM Rolando Barbosa, PT [facility]   6/22/2018 10:00 AM Rolando Barbosa PT [facility]   6/26/2018 10:00 AM Rolando Barbosa PT [facility]   6/28/2018 10:00 AM Rolando Barbosa PT [facility]   7/5/2018 1:30 PM Zaid Carey  [address]

## 2018-06-13 ENCOUNTER — HOSPITAL ENCOUNTER (OUTPATIENT)
Dept: PHYSICAL THERAPY | Age: 33
Discharge: HOME | End: 2018-06-13
Payer: SELF-PAY

## 2018-06-13 PROCEDURE — 97014 ELECTRIC STIMULATION THERAPY: CPT

## 2018-06-13 PROCEDURE — 97140 MANUAL THERAPY 1/> REGIONS: CPT

## 2018-06-13 PROCEDURE — 97110 THERAPEUTIC EXERCISES: CPT

## 2018-06-13 NOTE — PROGRESS NOTES
PHYSICAL THERAPY - DAILY TREATMENT NOTE    Patient Name: Mohidner Starr        Date: 2018  : 1985   YES Patient  Verified  Visit #:     Insurance: Payor: SELF PAY / Plan: [facility] SELF PAY / Product Type: Self Pay /      In time: 1010 Out time: 1120   Total Treatment Time: 70     Medicare Time Tracking (below)   Total Timed Codes (min):  70 1:1 Treatment Time:       TREATMENT AREA =  Chronic neck and back pain [M54.2, M54.9]    SUBJECTIVE  Pain Level (on 0 to 10 scale):  3  / 10   Medication Changes/New allergies or changes in medical history, any new surgeries or procedures? NO    If yes, update Summary List   Subjective Functional Status/Changes:  []  No changes reported     Pt reports tightness through the low back, upper back. OBJECTIVE  Modalities Rationale:     decrease pain to improve patient's ability to perform pain free ADLs. 10 min [x] Estim, type/location: IFC                                     []  att     []  unatt     []  w/US     []  w/ice    []  w/heat    min []  Mechanical Traction: type/lbs                   []  pro   []  sup   []  int   []  cont    []  before manual    []  after manual    min []  Ultrasound, settings/location:      min []  Iontophoresis w/ dexamethasone, location:                                               []  take home patch       []  in clinic    min []  Ice     []  Heat    location/position:     min []  Vasopneumatic Device, press/temp:     min []  Other:    [x] Skin assessment post-treatment (if applicable):    [x]  intact    []  redness- no adverse reaction     []redness  adverse reaction:        40 min Therapeutic Exercise:  [x]  See flow sheet   Rationale:      increase ROM, increase strength and improve coordination to improve the patients ability to perform pain free ADLs. 20 min Manual Therapy: STM/DTM (B) t/s and l/s paraspinals. TS pa mobs.     Rationale:      decrease pain, increase ROM, increase tissue extensibility and decrease trigger points to improve patient's ability to perform pain free ADLs. min Patient Education:  YES  Reviewed HEP   []  Progressed/Changed HEP based on: Other Objective/Functional Measures: Therex per flow sheet. Post Treatment Pain Level (on 0 to 10) scale:   2  / 10     ASSESSMENT  Assessment/Changes in Function:     Good pain response to session. Tight through upper thoracic. []  See Progress Note/Recertification   Patient will continue to benefit from skilled PT services to modify and progress therapeutic interventions, address functional mobility deficits, address ROM deficits, address strength deficits, analyze and address soft tissue restrictions, analyze and cue movement patterns, analyze and modify body mechanics/ergonomics and assess and modify postural abnormalities to attain remaining goals. Progress toward goals / Updated goals:    No change in progress toward LTG's with today's session.       PLAN  [x]  Upgrade activities as tolerated YES Continue plan of care   []  Discharge due to :    []  Other:      Therapist: Stacy Gabriel PTA    Date: 6/13/2018 Time: 10:57 AM     Future Appointments  Date Time Provider Barry Ndiaye   6/15/2018 10:00 AM Grayson Newton, PT [facility]   6/19/2018 11:30 AM Grayson Newton, PT [facility]   6/22/2018 10:00 AM Grayson Newton, PT [facility]   6/26/2018 10:00 AM Grayson Newton, PT [facility]   6/28/2018 10:00 AM Grayson Newton, PT [facility]   7/5/2018 1:30 PM Evon Alvarado  [address]

## 2018-06-15 ENCOUNTER — HOSPITAL ENCOUNTER (OUTPATIENT)
Dept: PHYSICAL THERAPY | Age: 33
Discharge: HOME | End: 2018-06-15
Payer: SELF-PAY

## 2018-06-15 PROCEDURE — 97014 ELECTRIC STIMULATION THERAPY: CPT

## 2018-06-15 PROCEDURE — 97110 THERAPEUTIC EXERCISES: CPT

## 2018-06-15 PROCEDURE — 97140 MANUAL THERAPY 1/> REGIONS: CPT

## 2018-06-15 NOTE — PROGRESS NOTES
PHYSICAL THERAPY - DAILY TREATMENT NOTE    Patient Name: Coleen Curry        Date: 6/15/2018  : 1985   YES Patient  Verified  Visit #:   15   of   24  Insurance: Payor: SELF PAY / Plan: [facility] SELF PAY / Product Type: Self Pay /      In time: 10:10 Out time: 11:10   Total Treatment Time: 60     Medicare Time Tracking (below)   Total Timed Codes (min):  na 1:1 Treatment Time:  na     TREATMENT AREA =  Chronic neck and back pain [M54.2, M54.9]    SUBJECTIVE  Pain Level (on 0 to 10 scale):  4  / 10   Medication Changes/New allergies or changes in medical history, any new surgeries or procedures? NO    If yes, update Summary List   Subjective Functional Status/Changes:  []  No changes reported     Not too bad today. Some days I feel better, but some days I dont. OBJECTIVE  Modalities Rationale:     decrease pain to improve patient's ability to perform pain free ADLs. 10 min [x] Estim, type/location: IFC                                                                     []  att     []  unatt     []  w/US     []  w/ice    []  w/heat     min []  Mechanical Traction: type/lbs                    []  pro   []  sup   []  int   []  cont    []  before manual    []  after manual     min []  Ultrasound, settings/location:        min []  Iontophoresis w/ dexamethasone, location:                                                []  take home patch       []  in clinic     min []  Ice     []  Heat    location/position:       min []  Vasopneumatic Device, press/temp:       min []  Other:     [x] Skin assessment post-treatment (if applicable):    [x]  intact    []  redness- no adverse reaction     []redness  adverse reaction:         40 min Therapeutic Exercise:  [x]  See flow sheet   Rationale:      increase ROM, increase strength and improve coordination to improve the patients ability to perform pain free ADLs.    10 min Manual Therapy: STM/DTM (B) t/s and l/s paraspinals. MARGIE chen. Rationale:      decrease pain, increase ROM, increase tissue extensibility and decrease trigger points to improve patient's ability to perform pain free ADLs. min Patient Education:  YES  Reviewed HEP   []  Progressed/Changed HEP based on: Other Objective/Functional Measures:    Some decrease in soft tissue tension noted at para      Post Treatment Pain Level (on 0 to 10) scale:   2  / 10     ASSESSMENT  Assessment/Changes in Function:     Good ratna to all Rx without increase in pain      []  See Progress Note/Recertification   Patient will continue to benefit from skilled PT services to modify and progress therapeutic interventions, address functional mobility deficits, address ROM deficits, address strength deficits, analyze and address soft tissue restrictions, analyze and cue movement patterns, analyze and modify body mechanics/ergonomics and assess and modify postural abnormalities to attain remaining goals.    Progress toward goals / Updated goals:    Slow progress with pain reduction      PLAN  []  Upgrade activities as tolerated YES Continue plan of care   []  Discharge due to :    []  Other:      Therapist: Antoinette Tobias, PT, OCS, SCS, CSCS    Date: 6/15/2018 Time: 6:27 AM       Future Appointments  Date Time Provider Barry Ndiaye   6/15/2018 10:00 AM Eda Nearing, PT [facility]   6/19/2018 11:30 AM Eda Nearing, PT [facility]   6/22/2018 10:00 AM Eda Reyezing, PT [facility]   6/26/2018 10:00 AM Eda Nearing, PT [facility]   6/28/2018 10:00 AM Eda Reyezing, PT [facility]   7/5/2018 1:30 PM Licha Falk  [address]

## 2018-06-19 ENCOUNTER — APPOINTMENT (OUTPATIENT)
Dept: PHYSICAL THERAPY | Age: 33
End: 2018-06-19
Payer: SELF-PAY

## 2018-06-22 ENCOUNTER — APPOINTMENT (OUTPATIENT)
Dept: PHYSICAL THERAPY | Age: 33
End: 2018-06-22
Payer: SELF-PAY

## 2018-06-26 ENCOUNTER — APPOINTMENT (OUTPATIENT)
Dept: PHYSICAL THERAPY | Age: 33
End: 2018-06-26
Payer: SELF-PAY

## 2018-06-28 ENCOUNTER — APPOINTMENT (OUTPATIENT)
Dept: PHYSICAL THERAPY | Age: 33
End: 2018-06-28
Payer: SELF-PAY

## 2018-07-18 NOTE — PROGRESS NOTES
[address] PHYSICAL THERAPY  [address] [place] [address] 51, [place] [address],[facility], [facility] - Phone: [phone]  Fax: [phone]  DISCHARGE SUMMARY  Patient Name: Coleen Curry : 1985   Treatment/Medical Diagnosis: Chronic neck and back pain [M54.2, M54.9]   Referral Source: Casimir Blizzard, MD       Date of Initial Visit: 18 Attended Visits: 15 Missed Visits: 0      SUMMARY OF TREATMENT  Coleen Curry has been seen at our clinic 2-3x/wk for a total of 15 visits. Pt treatment has consisted of  therapeutic exercise for directionally preferred ROM ex, hip/core strengthening, and manual therapy(jt mobilization and deep tissue mobilization)  CURRENT STATUS  Pt has had a good tolerance to physical therapy treatment. He reports being compliant with home exercise program which includes directional preferred exercise to reduce discogenic LE symptom. However, he continues to continues to complain of difficulty and increase pain with prolonged sitting and bending. We feel that his progress has reached its plateau at this point. Therefore, we would like to discharge Mr. Arora from PT treatment at this time.            Goal/Measure of Progress Goal Met? 1. Decrease Max pain by 50-75% to assist with ADLs   Status at last Eval: 810 Current Status: 7/10 progressing   2. Increase FOTO score to 59 % show functional improvement   Status at last Eval: 39% Current Status: 40% progressing   3. Will rate >/= +5 on Global Rating of Change and be prepared to DC to HEP. Status at last Eval: na Current Status: +1 progressing        RECOMMENDATIONS  Discharge from physical therapy treatment with HEP. Specifics:   If you have any questions/comments please contact us directly at [phone]. Thank you for allowing us to assist in the care of your patient.     Therapist Signature: Irasema Rivero, MICHELLE, OCS, SCS, CSCS Date: 2018     Time: 7:23 AM

## 2019-12-17 ENCOUNTER — OFFICE VISIT (OUTPATIENT)
Dept: ORTHOPEDIC SURGERY | Age: 34
End: 2019-12-17

## 2019-12-17 VITALS
HEIGHT: 72 IN | DIASTOLIC BLOOD PRESSURE: 86 MMHG | BODY MASS INDEX: 27.58 KG/M2 | HEART RATE: 96 BPM | SYSTOLIC BLOOD PRESSURE: 135 MMHG | TEMPERATURE: 98 F | WEIGHT: 203.6 LBS | OXYGEN SATURATION: 96 %

## 2019-12-17 DIAGNOSIS — M54.50 LUMBAR PAIN: Primary | ICD-10-CM

## 2019-12-17 DIAGNOSIS — D86.9 SARCOIDOSIS: ICD-10-CM

## 2019-12-17 RX ORDER — PREDNISONE 10 MG/1
20 TABLET ORAL
COMMUNITY
Start: 2019-11-13

## 2019-12-17 RX ORDER — FOLIC ACID 1 MG/1
1 TABLET ORAL
COMMUNITY
Start: 2019-06-05
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
## 2020-01-20 ENCOUNTER — DOCUMENTATION ONLY (OUTPATIENT)
Dept: ORTHOPEDIC SURGERY | Age: 35
End: 2020-01-20

## 2020-01-20 NOTE — PROGRESS NOTES
Check #[id number] for 1950.00 came in and was put in bag in [facility] to keep until chart review and phone conference is done by Dr. Ewa Zeng.

## 2020-01-20 NOTE — PROGRESS NOTES
Files from outside locations came in and were put in a white binder and chart from Dr. Tarik Quinn 1/14/20 phone conference and Dr. Cameron Tomlin 10/23/19 phone conference were put together in one chart and left in Sharon's office to give to Dr. Cj Persaud to precede with his chart review for [facility].

## 2020-01-22 ENCOUNTER — OFFICE VISIT (OUTPATIENT)
Dept: HEMATOLOGY | Age: 35
End: 2020-01-22

## 2020-01-22 VITALS
WEIGHT: 212 LBS | HEART RATE: 86 BPM | BODY MASS INDEX: 28.71 KG/M2 | HEIGHT: 72 IN | DIASTOLIC BLOOD PRESSURE: 80 MMHG | TEMPERATURE: 97.6 F | SYSTOLIC BLOOD PRESSURE: 130 MMHG | OXYGEN SATURATION: 98 %

## 2020-01-22 DIAGNOSIS — D86.9 SARCOIDOSIS: Primary | ICD-10-CM

## 2020-01-22 NOTE — PROGRESS NOTES
[facility], MD, Sissy Ege, Oneta Severance, MD      [facility] TINO Coelho, [facility]     April S Javier [facility]   Jackson Lowry CHARLES-TIFFANY English [facility]       [facility] [address]    at [place] [address] [address], [address] Jill Biswas  22.    [phone]    FAX: [address] [place] [address], [address]    [phone]    FAX: [phone]       Patient Care Team:  David Patterson MD as PCP - General ([facility])  David Patterson MD as PCP - [facility] Provider  Dominik Warner MD (Pulmonary Disease)      Problem List  Date Reviewed: 12/17/2019          Codes Class Noted    Post traumatic stress disorder (PTSD) ICD-10-CM: F43.10  ICD-9-CM: 309.81  4/16/2018        Sarcoidosis ICD-10-CM: D86.9  ICD-9-CM: 135  1/29/2018    Overview Signed 4/16/2018  5:21 PM by David Patterson MD     Pulmonary and hepatic involvement                   The clinicians listed above have asked me to see Alejandra Fields in consultation regarding elevated liver enzymes and its management. All medical records sent by the referring physicians were reviewed including imaging studies   and pathology. The patient is a 37 y.o. Black male who was found to have elevated liver enzymes in 2018. Serologic evaluation for markers of chronic liver disease was negative. CT scan of the liver was performed in 8/2019. The results of the imaging demonstrated a normal appearing liver. There is mesenteric adenopathy. A liver biopsy was performed in 1/2018. This demonstrated non-caseating granulomas consistent with hepatic sarcoidosis. A lung biopsy and mediastinal lymph node biopsy was also performed in 1/2018.   This also demonstrated sarcoidosis    The patient has no symptoms which can be attributed to the liver disorder. The patient is not currently experiencing the following symptoms of liver disease:  fatigue, pain in the right side over the liver,     The patient completes all daily activities without any functional limitations. ASSESSMENT AND PLAN:  Hepatic Sarcoidosis  The diagnosis is based upon liver biopsy, confirmed sarcoidosis in other organs, an elevation in ALP. A liver biopsy was performed in 1/2018. This demonstrates sarcoid granulomas with stage 1 portal fibrosis. AST is elevated. ALT is normal.  ALP is elevated. Liver function is normal.  The platelet count is normal.      Hepatic Sarcoidosis does not lead to progressive liver injury or cirhrosis in 90% of patients. There is in general no reason to treat hepatic sarcoidosis since the natural history is in most cases benign. About 10% of patients with hepatic sarcoidosis do progress with increasing fibrosis and develop cirrhosis. These patients have a very inflammatory form of sarcoidosis and can have systemic symtpoms such as fevers. Patients with symptomatic sarcoidosis can be treated with prednisone. Methotrexate has also been utilized. However, none of these immune suppressive treatments has been proven to be effective in RCTs. There is no contraindication to treat extrahepatic sarcoidosis with any medication needed. The need to perform an assessment of liver fibrosis was discussed with the patient. The Fibroscan can assess liver fibrosis and determine if a patient has advanced fibrosis or cirrhosis without the need for liver biopsy. This will be performed at the next office visit. The Fibroscan can be repeated annually or as often as clinically indicated to assess for fibrosis progression and/or regression.     Screening for Hepatocellular Carcinoma  HCC screening is not necessary if the patient has no evidence of cirrhosis. Treatment of other medical problems in patients with chronic liver disease  There are no contraindications for the patient to take most medications that are necessary for treatment of other medical issues. Counseling for alcohol in patients with chronic liver disease  The patient was counseled regarding alcohol consumption and the effect of alcohol on chronic liver disease. The patient does not consume any significant amount of alcohol. Vaccinations   Vaccination for viral hepatitis B is not needed. The patient has serologic evidence of prior exposure or vaccination with immunity. The need for vaccination against viral hepatitis A will be assessed with serologic and instituted as appropriate. Routine vaccinations against other bacterial and viral agents can be performed as indicated. Annual flu vaccination should be administered if indicated. ALLERGIES  No Known Allergies    MEDICATIONS  Current Outpatient Medications   Medication Sig    predniSONE (DELTASONE) 10 mg tablet Take 40 mg by mouth.  folic acid (FOLVITE) 1 mg tablet Take 1 mg by mouth.  vit B Cmplx 3-FA-Vit C-Biotin (NEPHRO SHELTON RX) 1- mg-mg-mcg tablet Take 1 Tab by mouth daily. No current facility-administered medications for this visit. SYSTEM REVIEW NOT RELATED TO LIVER DISEASE OR REVIEWED ABOVE:  Constitution systems: Negative for fever, chills, weight gain, weight loss. Eyes: Negative for visual changes. ENT: Negative for sore throat, painful swallowing. Respiratory: Negative for cough, hemoptysis, SOB. Cardiology: Negative for chest pain, palpitations. GI:  Negative for constipation or diarrhea. : Negative for urinary frequency, dysuria, hematuria, nocturia. Skin: Negative for rash. Hematology: Negative for easy bruising, blood clots. Musculo-skelatal: Negative for back pain, muscle pain, weakness.   Neurologic: Negative for headaches, dizziness, vertigo, memory problems not related to HE. Psychology: Negative for anxiety, depression. FAMILY HISTORY:  The father   of unknown cause. The mother Has/had the following chronic disease(s): None. There is no family history of liver disease. SOCIAL HISTORY:  The patient has never been . The patient has 1 child. The patient has never used tobacco products. The patient has never consumed significant amounts of alcohol. The patient currently works full time as an . PHYSICAL EXAMINATION:  Visit Vitals  /80 (BP 1 Location: Right arm, BP Patient Position: Sitting)   Pulse 86   Temp 97.6 °F (36.4 °C)   Ht 6' (1.829 m)   Wt 212 lb (96.2 kg)   SpO2 98%   BMI 28.75 kg/m²     General: No acute distress. Eyes: Sclera anicteric. ENT: No oral lesions. Thyroid normal.  Nodes: No adenopathy. Skin: No spider angiomata. No jaundice. No palmar erythema. Respiratory: Lungs clear to auscultation. Cardiovascular: Regular heart rate. No murmurs. No JVD. Abdomen: Soft non-tender. Liver size normal to percussion/palpation. Spleen not palpable. No obvious ascites. Extremities: No edema. No muscle wasting. No gross arthritic changes. Neurologic: Alert and oriented. Cranial nerves grossly intact. No asterixis. LABORATORY STUDIES:  From 10/2019  AST/ALT/ALP/T Bili/ALB:  50/113/497/1.5/3.9  WBC/HB/PLT/INR:  4.7/14.8/145  BUN/CREAT:  20/0.8    SEROLOGIES:  2018. HBsurface antigen negative, anti-HBsurface positive, anti-HCV negative, LISA negative, AMA negative, anti-HIV negative,     LIVER HISTOLOGY:  2018. Granulomas consistent with sarcoidosis and portal fibrosis     ENDOSCOPIC PROCEDURES:  Not available or performed    RADIOLOGY:  2019. CT scan chest and abdomen without IV contrast.  Normal appearing liver. No liver mass lesions. Normal spleen. Mesenteric lymphadenopathy. No ascites.   Lung parenchymal disease and lymphadenopathy consistent with sarcodosis    OTHER TESTING:  Not available or performed    FOLLOW-UP:  All of the issues listed above in the Assessment and Plan were discussed with the patient. All questions were answered. The patient expressed a clear understanding of the above. [facility] [place] [address] in 4 weeks for Fibroscan to review all data and determine the treatment plan.       Eliezer Hauser MD  [address]  [address] [place] [address], [address] [place] [address], [address] [place] [address] - [address]  [address] [place] [address]

## 2020-02-02 PROBLEM — R74.8 ELEVATED LIVER ENZYMES: Status: ACTIVE | Noted: 2020-02-02

## 2020-03-04 ENCOUNTER — OFFICE VISIT (OUTPATIENT)
Dept: HEMATOLOGY | Age: 35
End: 2020-03-04

## 2020-03-04 ENCOUNTER — HOSPITAL ENCOUNTER (OUTPATIENT)
Dept: LAB | Age: 35
Discharge: HOME | End: 2020-03-04
Payer: COMMERCIAL

## 2020-03-04 VITALS
HEART RATE: 97 BPM | HEIGHT: 72 IN | DIASTOLIC BLOOD PRESSURE: 78 MMHG | OXYGEN SATURATION: 99 % | RESPIRATION RATE: 17 BRPM | TEMPERATURE: 97.4 F | SYSTOLIC BLOOD PRESSURE: 112 MMHG | WEIGHT: 214.4 LBS | BODY MASS INDEX: 29.04 KG/M2

## 2020-03-04 DIAGNOSIS — D86.9 SARCOIDOSIS: ICD-10-CM

## 2020-03-04 DIAGNOSIS — D86.9 SARCOIDOSIS: Primary | ICD-10-CM

## 2020-03-04 PROBLEM — E11.9 DIABETES MELLITUS TYPE 2, CONTROLLED (HCC): Status: ACTIVE | Noted: 2020-03-04

## 2020-03-04 LAB
ALBUMIN SERPL-MCNC: 3.4 G/DL (ref 3.4–5)
ALBUMIN/GLOB SERPL: 0.8 {RATIO} (ref 0.8–1.7)
ALP SERPL-CCNC: 404 U/L (ref 45–117)
ALT SERPL-CCNC: 190 U/L (ref 16–61)
ANION GAP SERPL CALC-SCNC: 6 MMOL/L (ref 3–18)
AST SERPL-CCNC: 90 U/L (ref 10–38)
BASOPHILS # BLD: 0 K/UL (ref 0–0.1)
BASOPHILS NFR BLD: 0 % (ref 0–2)
BILIRUB DIRECT SERPL-MCNC: 0.8 MG/DL (ref 0–0.2)
BILIRUB SERPL-MCNC: 1.2 MG/DL (ref 0.2–1)
BUN SERPL-MCNC: 18 MG/DL (ref 7–18)
BUN/CREAT SERPL: 17 (ref 12–20)
CALCIUM SERPL-MCNC: 9.4 MG/DL (ref 8.5–10.1)
CHLORIDE SERPL-SCNC: 102 MMOL/L (ref 100–111)
CO2 SERPL-SCNC: 32 MMOL/L (ref 21–32)
CREAT SERPL-MCNC: 1.04 MG/DL (ref 0.6–1.3)
DIFFERENTIAL METHOD BLD: ABNORMAL
EOSINOPHIL # BLD: 0.2 K/UL (ref 0–0.4)
EOSINOPHIL NFR BLD: 2 % (ref 0–5)
ERYTHROCYTE [DISTWIDTH] IN BLOOD BY AUTOMATED COUNT: 14.7 % (ref 11.6–14.5)
GLOBULIN SER CALC-MCNC: 4.1 G/DL (ref 2–4)
GLUCOSE SERPL-MCNC: 253 MG/DL (ref 74–99)
HCT VFR BLD AUTO: 46.2 % (ref 36–48)
HGB BLD-MCNC: 14.5 G/DL (ref 13–16)
LYMPHOCYTES # BLD: 0.7 K/UL (ref 0.9–3.6)
LYMPHOCYTES NFR BLD: 11 % (ref 21–52)
MCH RBC QN AUTO: 28.3 PG (ref 24–34)
MCHC RBC AUTO-ENTMCNC: 31.4 G/DL (ref 31–37)
MCV RBC AUTO: 90.1 FL (ref 74–97)
MONOCYTES # BLD: 0.6 K/UL (ref 0.05–1.2)
MONOCYTES NFR BLD: 9 % (ref 3–10)
NEUTS SEG # BLD: 4.9 K/UL (ref 1.8–8)
NEUTS SEG NFR BLD: 78 % (ref 40–73)
PLATELET # BLD AUTO: 169 K/UL (ref 135–420)
PMV BLD AUTO: 12.2 FL (ref 9.2–11.8)
POTASSIUM SERPL-SCNC: 3.7 MMOL/L (ref 3.5–5.5)
PROT SERPL-MCNC: 7.5 G/DL (ref 6.4–8.2)
RBC # BLD AUTO: 5.13 M/UL (ref 4.7–5.5)
SODIUM SERPL-SCNC: 140 MMOL/L (ref 136–145)
WBC # BLD AUTO: 6.4 K/UL (ref 4.6–13.2)

## 2020-03-04 PROCEDURE — 85025 COMPLETE CBC W/AUTO DIFF WBC: CPT

## 2020-03-04 PROCEDURE — 36415 COLL VENOUS BLD VENIPUNCTURE: CPT

## 2020-03-04 PROCEDURE — 80048 BASIC METABOLIC PNL TOTAL CA: CPT

## 2020-03-04 PROCEDURE — 80076 HEPATIC FUNCTION PANEL: CPT

## 2020-03-04 RX ORDER — INSULIN LISPRO 100 [IU]/ML
INJECTION, SOLUTION INTRAVENOUS; SUBCUTANEOUS
COMMUNITY
Start: 2020-01-02

## 2020-03-04 RX ORDER — INSULIN GLARGINE 100 [IU]/ML
50 INJECTION, SOLUTION SUBCUTANEOUS
COMMUNITY
Start: 2020-01-03

## 2020-03-04 NOTE — PROGRESS NOTES
[address], Abelino CHURCH Collie Maxwell, MD Valerio Irving, TINO Cote, [facility]     April CAROL ANN Herrera, [facility]   JOSH Verma-TIFFANY Andino, [facility]       Annalisa Deputado S[address]    at [address], [facility] Jill Biswas  22.    [phone]    FAX: [address] [place] [address], [address]    [phone]    FAX: [phone]       Patient Care Team:  Parminder Raphael MD as PCP - General (Internal Medicine)  Harley Syeks MD as PCP - [facility]  Pamela Rosa MD (Pulmonary Disease)      Problem List  Date Reviewed: 2/2/2020          Codes Class Noted    Elevated liver enzymes ICD-10-CM: R74.8  ICD-9-CM: 790.5  2/2/2020        Post traumatic stress disorder (PTSD) ICD-10-CM: F43.10  ICD-9-CM: 309.81  4/16/2018        Sarcoidosis ICD-10-CM: D86.9  ICD-9-CM: 474  1/29/2018    Overview Signed 4/16/2018  5:21 PM by Harley Sykes MD     Pulmonary and hepatic involvement                   Bridget Tao returns to the [place] [address] today for education and management of sarcoidosis. The active problem list, all pertinent past medical history, medications, liver histology, endoscopic studies, radiologic findings and laboratory findings related to the liver disorder were reviewed with the patient. The patient is a 37 y.o. Black male who was found to have elevated liver enzymes in 2018. Serologic evaluation for markers of chronic liver disease was negative. CT scan of the liver was performed in 8/2019. The results of the imaging demonstrated a normal appearing liver. There is mesenteric adenopathy. A liver biopsy was performed in 1/2018.    This demonstrated non-caseating granulomas consistent with hepatic sarcoidosis. A lung biopsy and mediastinal lymph node biopsy was also performed in 1/2018. This also demonstrated sarcoidosis. The patient has no symptoms which can be attributed to the liver disorder. The patient completes all daily activities without any functional limitations. The patient has not experienced fatigue, fevers, chills, shortness of breath, chest pain, pain in the right side over the liver, diffuse abdominal pain, nausea, vomiting, constipation, diarrhrea, dry eyes, dry mouth, arthralgias, myalgias, yellowing of the eyes or skin, itching, dark urine, problems concentrating, swelling of the abdomen, swelling of the lower extremities, hematemesis, or hematochezia. ASSESSMENT AND PLAN:  Hepatic Sarcoidosis  The diagnosis is based upon liver biopsy, confirmed sarcoidosis in other organs, an elevation in ALP. A liver biopsy was performed in 1/2018. This demonstrates sarcoid granulomas with stage 1 portal fibrosis. The most recent laboratory studies indicate that the liver transaminases are elevated, alkaline phosphatase is elevated, tests of hepatic synthetic and metabolic function are normal, and the platelet count is normal.      Hepatic Sarcoidosis does not lead to progressive liver injury or cirhrosis in 90% of patients. There is in general no reason to treat hepatic sarcoidosis since the natural history is in most cases benign. About 10% of patients with hepatic sarcoidosis do progress with increasing fibrosis and develop cirrhosis. These patients have a very inflammatory form of sarcoidosis and can have systemic symtpoms such as fevers. Patients with symptomatic sarcoidosis can be treated with prednisone. Methotrexate has also been utilized. However, none of these immune suppressive treatments has been proven to be effective in randomized clinical trials.     We recommend stopping the prednisone if it is being used to treat hepatic sarcoidosis. There is no contraindication to treat extrahepatic sarcoidosis with any medication needed. The need to perform an assessment of liver fibrosis was discussed with the patient. The Fibroscan can assess liver fibrosis and determine if a patient has advanced fibrosis or cirrhosis without the need for liver biopsy. This will be performed at the next office visit. The Fibroscan can be repeated annually or as often as clinically indicated to assess for fibrosis progression and/or regression. Screening for Hepatocellular Carcinoma  HCC screening is not necessary if the patient has no evidence of cirrhosis. Treatment of other medical problems in patients with chronic liver disease  There are no contraindications for the patient to take most medications that are necessary for treatment of other medical issues. Counseling for alcohol in patients with chronic liver disease  The patient was counseled regarding alcohol consumption and the effect of alcohol on chronic liver disease. The patient does not consume any significant amount of alcohol. Vaccinations   Vaccination for viral hepatitis B is not needed. The patient has serologic evidence of prior exposure or vaccination with immunity. The need for vaccination against viral hepatitis A will be assessed with serologic and instituted as appropriate. Routine vaccinations against other bacterial and viral agents can be performed as indicated. Annual flu vaccination should be administered if indicated. ALLERGIES  No Known Allergies    MEDICATIONS  Current Outpatient Medications   Medication Sig    predniSONE (DELTASONE) 10 mg tablet Take 40 mg by mouth.  folic acid (FOLVITE) 1 mg tablet Take 1 mg by mouth.  vit B Cmplx 3-FA-Vit C-Biotin (NEPHRO SHELTON RX) 1- mg-mg-mcg tablet Take 1 Tab by mouth daily. No current facility-administered medications for this visit.         SYSTEM REVIEW NOT RELATED TO LIVER DISEASE OR REVIEWED ABOVE:  Constitution systems: Negative for fever, chills, weight gain, weight loss. Eyes: Negative for visual changes. ENT: Negative for sore throat, painful swallowing. Respiratory: Negative for cough, hemoptysis, SOB. Cardiology: Negative for chest pain, palpitations. GI:  Negative for constipation or diarrhea. : Negative for urinary frequency, dysuria, hematuria, nocturia. Skin: Negative for rash. Hematology: Negative for easy bruising, blood clots. Musculo-skelatal: Negative for back pain, muscle pain, weakness. Neurologic: Negative for headaches, dizziness, vertigo, memory problems not related to HE. Psychology: Negative for anxiety, depression. FAMILY HISTORY:  The father  of unknown cause. The mother has the following chronic disease(s): None. There is no family history of liver disease. SOCIAL HISTORY:  The patient has long time steady female partner. The patient has 1 child. The patient has never used tobacco products. The patient has never consumed significant amounts of alcohol. The patient currently works full time as an . PHYSICAL EXAMINATION:  Visit Vitals  /78 (BP 1 Location: Left arm, BP Patient Position: Sitting)   Pulse 97   Temp 97.4 °F (36.3 °C) (Tympanic)   Resp 17   Ht 6' (1.829 m)   Wt 214 lb 6.4 oz (97.3 kg)   SpO2 99%   BMI 29.08 kg/m²     General: No acute distress. Eyes: Sclera anicteric. ENT: No oral lesions. Thyroid normal.  Nodes: No adenopathy. Skin: No spider angiomata. No jaundice. No palmar erythema. Respiratory: Lungs clear to auscultation. Cardiovascular: Regular heart rate. No murmurs. No JVD. Abdomen: Soft non-tender. Liver size normal to percussion/palpation. Spleen not palpable. No obvious ascites. Extremities: No edema. No muscle wasting. No gross arthritic changes. Neurologic: Alert and oriented. Cranial nerves grossly intact. No asterixis.     LABORATORY STUDIES:  [facility] [place] [facility] 3/4/2020   WBC 4.6 - 13.2 K/uL 6.4   ANC 1.8 - 8.0 K/UL 4.9   HGB 13.0 - 16.0 g/dL 14.5    - 420 K/uL 169   AST 10 - 38 U/L 90 (H)   ALT 16 - 61 U/L 190 (H)   Alk Phos 45 - 117 U/L 404 (H)   Bili, Total 0.2 - 1.0 MG/DL 1.2 (H)   Bili, Direct 0.0 - 0.2 MG/DL 0.8 (H)   Albumin 3.4 - 5.0 g/dL 3.4   BUN 7.0 - 18 MG/DL 18   Creat 0.6 - 1.3 MG/DL 1.04   Na 136 - 145 mmol/L 140   K 3.5 - 5.5 mmol/L 3.7   Cl 100 - 111 mmol/L 102   CO2 21 - 32 mmol/L 32   Glucose 74 - 99 mg/dL 253 (H)     From 10/2019  AST/ALT/ALP/T Bili/ALB:  50/113/497/1.5/3.9  WBC/HB/PLT/INR:  4.7/14.8/145  BUN/CREAT:  20/0.8    SEROLOGIES:  1/2018. HBsurface antigen negative, anti-HBsurface positive, anti-HCV negative, LISA negative, AMA negative, anti-HIV negative,     LIVER HISTOLOGY:  1/2018. Granulomas consistent with sarcoidosis and portal fibrosis     ENDOSCOPIC PROCEDURES:  Not available or performed    RADIOLOGY:  8/2019. CT scan chest and abdomen without IV contrast.  Normal appearing liver. No liver mass lesions. Normal spleen. Mesenteric lymphadenopathy. No ascites. Lung parenchymal disease and lymphadenopathy consistent with sarcodosis    OTHER TESTING:  Not available or performed    FOLLOW-UP:  All of the issues listed above in the Assessment and Plan were discussed with the patient. All questions were answered. The patient expressed a clear understanding of the above. [address] in 4 weeks for Fibroscan to review all data and determine the treatment plan.     FIOR Escoto  [facility] [place] of [address], [address] [place] [address], [address] [place] [address]   [phone]

## 2020-03-04 NOTE — PROGRESS NOTES
Chief Complaint   Patient presents with    Follow-up     Visit Vitals  /78 (BP 1 Location: Left arm, BP Patient Position: Sitting)   Pulse 97   Temp 97.4 °F (36.3 °C) (Tympanic)   Resp 17   Ht 6' (1.829 m)   Wt 214 lb 6.4 oz (97.3 kg)   SpO2 99%   BMI 29.08 kg/m²     3 most recent PHQ Screens 1/22/2020   Little interest or pleasure in doing things Not at all   Feeling down, depressed, irritable, or hopeless Not at all   Total Score PHQ 2 0     Abuse Screening Questionnaire 3/4/2020   Do you ever feel afraid of your partner? N   Are you in a relationship with someone who physically or mentally threatens you? N   Is it safe for you to go home? Y     Learning Assessment 3/4/2020   PRIMARY LEARNER Patient   HIGHEST LEVEL OF EDUCATION - PRIMARY LEARNER  GRADUATED HIGH SCHOOL OR GED   BARRIERS PRIMARY LEARNER NONE   CO-LEARNER CAREGIVER -   PRIMARY LANGUAGE ENGLISH   LEARNER PREFERENCE PRIMARY DEMONSTRATION     -   ANSWERED BY patient    RELATIONSHIP SELF     1. Have you been to the ER, urgent care clinic since your last visit? Hospitalized since your last visit? No    2. Have you seen or consulted any other health care providers outside of the [address] [place] [address] since your last visit? Include any pap smears or colon screening.  No

## 2020-03-19 ENCOUNTER — DOCUMENTATION ONLY (OUTPATIENT)
Dept: ORTHOPEDIC SURGERY | Age: 35
End: 2020-03-19

## 2020-03-19 NOTE — PROGRESS NOTES
Record review and Phone conference was done at some point in Feb.2020 Between [facility] attorneys at law and Dr. Marleny Palomino. Ck was sent to be deposited today 3/19/20 for this.

## 2022-03-18 PROBLEM — R74.8 ELEVATED LIVER ENZYMES: Status: ACTIVE | Noted: 2020-02-02

## 2022-03-18 PROBLEM — D86.9 SARCOIDOSIS: Status: ACTIVE | Noted: 2018-01-29

## 2022-03-19 PROBLEM — F43.10 POST TRAUMATIC STRESS DISORDER (PTSD): Status: ACTIVE | Noted: 2018-04-16

## 2022-03-19 PROBLEM — E11.9 DIABETES MELLITUS TYPE 2, CONTROLLED (HCC): Status: ACTIVE | Noted: 2020-03-04

## 2023-11-07 ENCOUNTER — TELEPHONE (OUTPATIENT)
Age: 38
End: 2023-11-07

## 2024-01-05 RX ORDER — NAPROXEN 500 MG/1
500 TABLET ORAL 2 TIMES DAILY PRN
COMMUNITY
Start: 2023-09-19

## 2024-01-05 RX ORDER — HYDROXYZINE HYDROCHLORIDE 25 MG/1
25 TABLET, FILM COATED ORAL EVERY 8 HOURS PRN
COMMUNITY
Start: 2023-06-22

## 2024-01-05 RX ORDER — METHOCARBAMOL 750 MG/1
750 TABLET, FILM COATED ORAL 3 TIMES DAILY PRN
COMMUNITY
Start: 2023-09-19 | End: 2024-01-08

## 2024-01-05 RX ORDER — SILDENAFIL 50 MG/1
TABLET, FILM COATED ORAL
COMMUNITY
Start: 2023-04-18

## 2024-01-05 RX ORDER — MIRTAZAPINE 15 MG/1
15 TABLET, FILM COATED ORAL
COMMUNITY
Start: 2023-11-27

## 2024-01-08 ENCOUNTER — OFFICE VISIT (OUTPATIENT)
Age: 39
End: 2024-01-08
Payer: COMMERCIAL

## 2024-01-08 ENCOUNTER — HOSPITAL ENCOUNTER (OUTPATIENT)
Facility: HOSPITAL | Age: 39
Setting detail: SPECIMEN
Discharge: HOME | End: 2024-01-11
Payer: COMMERCIAL

## 2024-01-08 VITALS
WEIGHT: 188 LBS | HEART RATE: 65 BPM | BODY MASS INDEX: 25.47 KG/M2 | OXYGEN SATURATION: 99 % | DIASTOLIC BLOOD PRESSURE: 88 MMHG | SYSTOLIC BLOOD PRESSURE: 135 MMHG | HEIGHT: 72 IN

## 2024-01-08 DIAGNOSIS — D86.9 SARCOIDOSIS: ICD-10-CM

## 2024-01-08 DIAGNOSIS — D86.9 SARCOIDOSIS: Primary | ICD-10-CM

## 2024-01-08 LAB
ALBUMIN SERPL-MCNC: 3.9 G/DL (ref 3.4–5)
ALBUMIN/GLOB SERPL: 0.9 (ref 0.8–1.7)
ALP SERPL-CCNC: 398 U/L (ref 45–117)
ALT SERPL-CCNC: 157 U/L (ref 16–61)
ANION GAP SERPL CALC-SCNC: 5 MMOL/L (ref 3–18)
AST SERPL-CCNC: 65 U/L (ref 10–38)
BASOPHILS # BLD: 0 K/UL (ref 0–0.1)
BASOPHILS NFR BLD: 0 % (ref 0–2)
BILIRUB DIRECT SERPL-MCNC: 0.5 MG/DL (ref 0–0.2)
BILIRUB SERPL-MCNC: 1 MG/DL (ref 0.2–1)
BUN SERPL-MCNC: 13 MG/DL (ref 7–18)
BUN/CREAT SERPL: 16 (ref 12–20)
CALCIUM SERPL-MCNC: 9.5 MG/DL (ref 8.5–10.1)
CHLORIDE SERPL-SCNC: 102 MMOL/L (ref 100–111)
CO2 SERPL-SCNC: 31 MMOL/L (ref 21–32)
CREAT SERPL-MCNC: 0.81 MG/DL (ref 0.6–1.3)
DIFFERENTIAL METHOD BLD: NORMAL
EOSINOPHIL # BLD: 0.2 K/UL (ref 0–0.4)
EOSINOPHIL NFR BLD: 3 % (ref 0–5)
ERYTHROCYTE [DISTWIDTH] IN BLOOD BY AUTOMATED COUNT: 13.2 % (ref 11.6–14.5)
GLOBULIN SER CALC-MCNC: 4.3 G/DL (ref 2–4)
GLUCOSE SERPL-MCNC: 176 MG/DL (ref 74–99)
HCT VFR BLD AUTO: 44.4 % (ref 36–48)
HGB BLD-MCNC: 14.7 G/DL (ref 13–16)
IMM GRANULOCYTES # BLD AUTO: 0 K/UL (ref 0–0.04)
IMM GRANULOCYTES NFR BLD AUTO: 0 % (ref 0–0.5)
INR PPP: 1 (ref 0.9–1.1)
LYMPHOCYTES # BLD: 2.5 K/UL (ref 0.9–3.6)
LYMPHOCYTES NFR BLD: 43 % (ref 21–52)
MCH RBC QN AUTO: 29.1 PG (ref 24–34)
MCHC RBC AUTO-ENTMCNC: 33.1 G/DL (ref 31–37)
MCV RBC AUTO: 87.7 FL (ref 78–100)
MONOCYTES # BLD: 0.5 K/UL (ref 0.05–1.2)
MONOCYTES NFR BLD: 8 % (ref 3–10)
NEUTS SEG # BLD: 2.6 K/UL (ref 1.8–8)
NEUTS SEG NFR BLD: 46 % (ref 40–73)
NRBC # BLD: 0 K/UL (ref 0–0.01)
NRBC BLD-RTO: 0 PER 100 WBC
PLATELET # BLD AUTO: 150 K/UL (ref 135–420)
PMV BLD AUTO: 11.8 FL (ref 9.2–11.8)
POTASSIUM SERPL-SCNC: 4.1 MMOL/L (ref 3.5–5.5)
PROT SERPL-MCNC: 8.2 G/DL (ref 6.4–8.2)
PROTHROMBIN TIME: 13 SEC (ref 11.9–14.7)
RBC # BLD AUTO: 5.06 M/UL (ref 4.35–5.65)
SODIUM SERPL-SCNC: 138 MMOL/L (ref 136–145)
WBC # BLD AUTO: 5.8 K/UL (ref 4.6–13.2)

## 2024-01-08 PROCEDURE — 99204 OFFICE O/P NEW MOD 45 MIN: CPT | Performed by: INTERNAL MEDICINE

## 2024-01-08 PROCEDURE — 80048 BASIC METABOLIC PNL TOTAL CA: CPT

## 2024-01-08 PROCEDURE — 36415 COLL VENOUS BLD VENIPUNCTURE: CPT

## 2024-01-08 PROCEDURE — 80076 HEPATIC FUNCTION PANEL: CPT

## 2024-01-08 PROCEDURE — 85025 COMPLETE CBC W/AUTO DIFF WBC: CPT

## 2024-01-08 PROCEDURE — 85610 PROTHROMBIN TIME: CPT

## 2024-01-08 NOTE — PROGRESS NOTES
[facility]      Shankar Puente MD, FACP, FACG, FAASLD    MD Kenna Ospina PA-EMILY Kitchen, [facility]     April S Kenneth, [facility]   Garland Morrow, [facility]    Bailee Stafford, [facility]    at [facility]    [address], [address]    [place]  [address] [phone]    FAX: [phone]   [facility]    at [facility]    [address], [address]    [place]  [address] [phone]    FAX: [phone]       Patient Care Team:  Olivia Do MD as PCP - Kenneth Stevensno MD (Pulmonology)  Maye Sanford MD (Gastroenterology)  Ifeanyi Bolden DO (Family Medicine)  Maegan Hansen APRN - NP (Nurse Practitioner)      Patient Active Problem List   Diagnosis    Elevated liver enzymes    Sarcoidosis    Post traumatic stress disorder (PTSD)    Diabetes mellitus type 2, controlled (HCC)       Rudy Hdz is being seen at [facility] for management of Hepatic Sarcoidosis.  The active problem list, all pertinent past medical history, medications, liver histology, endoscopic studies, radiologic findings and laboratory findings related to the liver disorder were reviewed and discussed with the patient.      The patient is a 38 y.o. male who was found to have elevated liver enzymes in 2018.      Serologic evaluation for markers of chronic liver disease was negative.    CT scan of the liver was performed in 8/2019.  The results of the imaging demonstrated a normal appearing liver.    There is mesenteric adenopathy.    A liver biopsy in 1/2018 demonstrated non-caseating granulomas consistent with hepatic sarcoidosis.    A lung biopsy and mediastinal lymph node biopsy was also performed in 1/2018.  This also demonstrated sarcoidosis    This is the

## 2024-02-14 ENCOUNTER — HOSPITAL ENCOUNTER (OUTPATIENT)
Facility: HOSPITAL | Age: 39
Setting detail: OUTPATIENT SURGERY
Discharge: HOME | End: 2024-02-14
Attending: INTERNAL MEDICINE | Admitting: INTERNAL MEDICINE
Payer: COMMERCIAL

## 2024-02-14 ENCOUNTER — APPOINTMENT (OUTPATIENT)
Facility: HOSPITAL | Age: 39
End: 2024-02-14
Attending: INTERNAL MEDICINE
Payer: COMMERCIAL

## 2024-02-14 VITALS
SYSTOLIC BLOOD PRESSURE: 119 MMHG | BODY MASS INDEX: 26.82 KG/M2 | RESPIRATION RATE: 16 BRPM | WEIGHT: 198 LBS | DIASTOLIC BLOOD PRESSURE: 83 MMHG | OXYGEN SATURATION: 98 % | HEIGHT: 72 IN | HEART RATE: 70 BPM | TEMPERATURE: 97.6 F

## 2024-02-14 DIAGNOSIS — R79.89 ELEVATED LFTS: ICD-10-CM

## 2024-02-14 LAB — GLUCOSE BLD STRIP.AUTO-MCNC: 263 MG/DL (ref 70–110)

## 2024-02-14 PROCEDURE — 7100000011 HC PHASE II RECOVERY - ADDTL 15 MIN: Performed by: INTERNAL MEDICINE

## 2024-02-14 PROCEDURE — 2500000003 HC RX 250 WO HCPCS: Performed by: INTERNAL MEDICINE

## 2024-02-14 PROCEDURE — 2580000003 HC RX 258: Performed by: INTERNAL MEDICINE

## 2024-02-14 PROCEDURE — 88307 TISSUE EXAM BY PATHOLOGIST: CPT

## 2024-02-14 PROCEDURE — 88313 SPECIAL STAINS GROUP 2: CPT

## 2024-02-14 PROCEDURE — 76942 ECHO GUIDE FOR BIOPSY: CPT

## 2024-02-14 PROCEDURE — 82962 GLUCOSE BLOOD TEST: CPT

## 2024-02-14 PROCEDURE — 3600007502: Performed by: INTERNAL MEDICINE

## 2024-02-14 PROCEDURE — 7100000010 HC PHASE II RECOVERY - FIRST 15 MIN: Performed by: INTERNAL MEDICINE

## 2024-02-14 PROCEDURE — 2709999900 HC NON-CHARGEABLE SUPPLY: Performed by: INTERNAL MEDICINE

## 2024-02-14 RX ORDER — ONDANSETRON 2 MG/ML
2 INJECTION INTRAMUSCULAR; INTRAVENOUS AS NEEDED
Status: DISCONTINUED | OUTPATIENT
Start: 2024-02-14 | End: 2024-02-14 | Stop reason: HOSPADM

## 2024-02-14 RX ORDER — LIDOCAINE HYDROCHLORIDE 10 MG/ML
INJECTION, SOLUTION EPIDURAL; INFILTRATION; INTRACAUDAL; PERINEURAL PRN
Status: DISCONTINUED | OUTPATIENT
Start: 2024-02-14 | End: 2024-02-14 | Stop reason: ALTCHOICE

## 2024-02-14 RX ORDER — MIDAZOLAM HYDROCHLORIDE 1 MG/ML
5 INJECTION, SOLUTION INTRAMUSCULAR; INTRAVENOUS ONCE
Status: DISCONTINUED | OUTPATIENT
Start: 2024-02-14 | End: 2024-02-14 | Stop reason: HOSPADM

## 2024-02-14 RX ORDER — SODIUM CHLORIDE 0.9 % (FLUSH) 0.9 %
5-40 SYRINGE (ML) INJECTION EVERY 12 HOURS SCHEDULED
Status: CANCELLED | OUTPATIENT
Start: 2024-02-14

## 2024-02-14 RX ORDER — FENTANYL CITRATE 50 UG/ML
25 INJECTION, SOLUTION INTRAMUSCULAR; INTRAVENOUS AS NEEDED
Status: DISCONTINUED | OUTPATIENT
Start: 2024-02-14 | End: 2024-02-14 | Stop reason: HOSPADM

## 2024-02-14 RX ORDER — SODIUM CHLORIDE 0.9 % (FLUSH) 0.9 %
5-40 SYRINGE (ML) INJECTION PRN
Status: CANCELLED | OUTPATIENT
Start: 2024-02-14

## 2024-02-14 RX ORDER — LIDOCAINE HYDROCHLORIDE 10 MG/ML
10 INJECTION, SOLUTION EPIDURAL; INFILTRATION; INTRACAUDAL; PERINEURAL ONCE
Status: DISCONTINUED | OUTPATIENT
Start: 2024-02-14 | End: 2024-02-14 | Stop reason: HOSPADM

## 2024-02-14 RX ORDER — SODIUM CHLORIDE 9 MG/ML
INJECTION, SOLUTION INTRAVENOUS PRN
Status: DISCONTINUED | OUTPATIENT
Start: 2024-02-14 | End: 2024-02-14 | Stop reason: HOSPADM

## 2024-02-14 RX ADMIN — SODIUM CHLORIDE: 9 INJECTION, SOLUTION INTRAVENOUS at 07:54

## 2024-02-14 ASSESSMENT — PAIN - FUNCTIONAL ASSESSMENT
PAIN_FUNCTIONAL_ASSESSMENT: 0-10
PAIN_FUNCTIONAL_ASSESSMENT: NONE - DENIES PAIN

## 2024-02-14 NOTE — DISCHARGE INSTRUCTIONS
[facility] LIVER [facility]      Shankar Puente MD, FACP, FACG, FAASLD      CORBIN Calvert, [facility]   Jackie Deckerlilijosh, [facility]   Bella Homero, [facility]  Garland Morrow, [facility]   Bailee Hutson, [facility]   Nell Topeteon, [facility]      Liver [facility]   [address]   [place]  [address] [phone]   FAX: [phone]  [facility]   [address], [address]   [place]  [address] [phone]   FAX: [phone]         LIVER BIOPSY DISCHARGE INSTRUCTIONS      Rudy Hdz  1985  Date: 2/14/2024    DIET:    You may resume your previous diet.    ACTIVITIES:  Rest quietly the rest of today.  You should not lift any objects more than 20 pounds for the next 2 days.  If you work sitting down without strenuous activity you may return to work tomorrow.  If you exert yourself or do heavy lifting at work you should take tomorrow off.  Do not drive or operate hazardous machinery for 12 hours after you are discharged from this procedure.    SPECIAL INSTRUCTIONS:  Do not use any aspirin or non-steroidal (Motin, Advil, Naproxen, etc) pain medications for the next 2 days.  You may use extra-strength Tylenol (acetaminophen) if you experience pain or discomfort later today.     Restarting blood thinners:  If you were taking blood thinners prior to the procedure you can restart these in 2 days.    Call the [facility] office if you experience any of the following:  Persistent or severe abdominal pain.  Persistent or severe abdominal distention.  Fever and chills   Nausea and vomiting.  New or unusual symptoms.    Follow-up care:  You should have a follow up appointment with Dr. Puente to review the results of the liver biopsy results in 2 weeks.  If you do not have an appointment please

## 2024-02-14 NOTE — OP NOTE
[facility]      Shankar Puente MD, FACP, FACG, FAASLD      Kenna Petit, PA-EMILY Cooper, [facility]   Jackie Deckerlilijosh, [facility]   Bella Homero, [facility]  Garland Morrow, [facility]   Bailee Hutson, [facility]   Nell Lara, [facility]   [address]   [place]  [address] [phone]   FAX: [phone]  Critical access hospital   [address], [address]   [place]  [address] [phone]   FAX: [phone]         LIVER BIOPSY PROCEDURE NOTE    NAME: Rudy Hdz  :  1985  MRN:  [id number]    INDICATIONS/PRE-OPERATIVE  DIAGNOSIS:  Hepatic Sarcoidosis    : Shankar Puente MD    SURGICAL ASSISTANT:  None    PROSTHETIC DEVICES, TISSUE GRAFTS, TRANSPLANTED ORGANS:  Not applicable    SEDATION: 1% Lidocaine injection 10 ml    PROCEDURE:  Informed consent to perform the procedure was obtained from the patient.  The patient was positioned on the edge of the stretcher lying flat in the supine position.  Ultrasound was utilized to image the liver.  The diaphragm and any major mass lesion or vascular structures within the liver were identified.  An appropriate site for liver biopsy was identified.  The distance from the surface of the skin to the liver capsule was 3 cm.  This area was prepped with betadine and draped in sterile fashion.  The skin was infiltrated with 1% lidocaine.  The deeper subcutanous tissues and liver capsule overlying the biopsy site were then infiltrated with 1% lidocaine until appropriate anesthesia was obtained.  A small incision was made in the skin so the biopsy devise could be easily inserted.  A total of 2 passes with the 16 gauge Bard biopsy devise was then made into the liver.  Core(s) of liver tissue totaling 4 cm in length were obtained and placed  into tissue fixative.  A band aid was placed over the biopsy site.  The patient was then repositioned on the right side and transported to the recovery area on the stretcher for routine monitoring until discharge.    The specimen was sent to pathology for processing via the normal transport mechanism.       SPECIMEN COLLECTED: Liver    INTERVENTIONS:  None    ESTIMATED BLOOD LOSS: Negligible.     COMPLICATIONS:  None    POST-OPERATIVE DIAGNOSIS: Same as Pre-operative Diagnosis      Shankar Puente MD  [address]  [place]  [address] [phone]  [facility]

## 2024-02-14 NOTE — H&P
[facility]      Shankar Puente MD, FACP, FACG, FAASLD      Kenna Petit, PA-C    Tyra Cooper, [facility]   Jackie Deckerrekha, [facility]   Bella Valentin, [facility]  Garland Morrow, [facility]   Bailee Hutson, [facility]   Nell Marco, [facility]      Liver [facility]   [address]   [place]  [address] [phone]   FAX: [phone]  [facility]   [address]   [place]  [address] [phone]   FAX: [phone]       PRE-PROCEDURE NOTE - LIVER BIOPSY    H and P from last office visit reviewed.    Allergies reviewed.  Out-patient medication list reviewed.    Patient Active Problem List   Diagnosis    Elevated liver enzymes    Sarcoidosis    Post traumatic stress disorder (PTSD)    Diabetes mellitus type 2, controlled (HCC)       No Known Allergies    No current facility-administered medications on file prior to encounter.     Current Outpatient Medications on File Prior to Encounter   Medication Sig Dispense Refill    Omega-3 Fatty Acids (FISH OIL PO) Take by mouth      IRON-FA-DSS-B CMPLX-VIT C PO Take 1 tablet by mouth daily      mirtazapine (REMERON) 15 MG tablet Take 1 tablet by mouth      naproxen (NAPROSYN) 500 MG tablet Take 1 tablet by mouth 2 times daily as needed      sildenafil (VIAGRA) 50 MG tablet Take 1 tablet by mouth 1 hour prior to sexual activity.  MAX: 1 tablet daily. (Patient not taking: Reported on 2/14/2024)      CYMBALTA 30 MG extended release capsule Take 1 capsule by mouth daily      hydrOXYzine HCl (ATARAX) 25 MG tablet Take 1 tablet by mouth every 8 hours as needed      inFLIXimab-abda (RENFLEXIS) 100 MG SOLR injection Infuse 40 mLs intravenously      folic acid (FOLVITE) 1 MG tablet Take 1 tablet by mouth      insulin lispro, 1 Unit Dial, (HUMALOG/ADMELOG) 100 UNIT/ML

## 2024-02-26 ENCOUNTER — TELEPHONE (OUTPATIENT)
Age: 39
End: 2024-02-26

## 2024-03-12 PROBLEM — R06.02 SOB (SHORTNESS OF BREATH): Status: ACTIVE | Noted: 2024-03-12

## 2024-03-12 PROBLEM — D84.9 IMMUNOCOMPROMISED STATE (HCC): Status: ACTIVE | Noted: 2022-06-06

## 2024-03-12 PROBLEM — R73.9 STEROID-INDUCED HYPERGLYCEMIA: Status: ACTIVE | Noted: 2018-10-08

## 2024-03-12 PROBLEM — E78.00 PURE HYPERCHOLESTEROLEMIA: Status: ACTIVE | Noted: 2019-10-23

## 2024-03-12 PROBLEM — T38.0X5A STEROID-INDUCED DIABETES MELLITUS (HCC): Status: ACTIVE | Noted: 2021-03-23

## 2024-03-12 PROBLEM — T38.0X5A STEROID-INDUCED HYPERGLYCEMIA: Status: ACTIVE | Noted: 2018-10-08

## 2024-03-12 PROBLEM — E09.9 STEROID-INDUCED DIABETES MELLITUS (HCC): Status: ACTIVE | Noted: 2021-03-23

## 2024-03-12 PROBLEM — F32.1 CURRENT MODERATE EPISODE OF MAJOR DEPRESSIVE DISORDER WITHOUT PRIOR EPISODE (HCC): Status: ACTIVE | Noted: 2024-02-21

## 2024-03-12 PROBLEM — Z91.199 PERSONAL HISTORY OF NONCOMPLIANCE WITH MEDICAL TREATMENT: Status: ACTIVE | Noted: 2018-09-06

## 2024-03-12 PROBLEM — R59.0 MEDIASTINAL ADENOPATHY: Status: ACTIVE | Noted: 2024-03-12

## 2024-04-10 ENCOUNTER — OFFICE VISIT (OUTPATIENT)
Age: 39
End: 2024-04-10

## 2024-04-10 VITALS
TEMPERATURE: 98.4 F | RESPIRATION RATE: 20 BRPM | OXYGEN SATURATION: 98 % | DIASTOLIC BLOOD PRESSURE: 84 MMHG | HEART RATE: 63 BPM | BODY MASS INDEX: 26.06 KG/M2 | HEIGHT: 72 IN | WEIGHT: 192.4 LBS | SYSTOLIC BLOOD PRESSURE: 127 MMHG

## 2024-04-10 DIAGNOSIS — K74.69 OTHER CIRRHOSIS OF LIVER (HCC): Primary | ICD-10-CM

## 2024-04-10 DIAGNOSIS — D86.0 PULMONARY SARCOIDOSIS (HCC): ICD-10-CM

## 2024-04-10 NOTE — PROGRESS NOTES
[facility]      Shankar Puente MD, FACP, FACG, FAASLD    MD Kenna Ospina PA-EMILY Kitchen, [facility]     April S Kenneth, [facility]   Garland Morrow, [facility]    Bailee Stafford, [facility]    at [facility]    [address]    [place]  [address] [phone]    FAX: [phone]   [facility]    at [facility]    [address]    [place]  [address] [phone]    FAX: [phone]       Patient Care Team:  Ifeanyi Bolden DO as PCP - General (Family Medicine)  Kenneth Serrano MD (Pulmonology)  Maye Sanford MD (Gastroenterology)  Ifeanyi Bolden DO (Family Medicine)  Maegan Hansen APRN - NP (Nurse Practitioner)      Patient Active Problem List   Diagnosis    Elevated liver enzymes    Sarcoidosis    Post traumatic stress disorder (PTSD)    Diabetes mellitus type 2, controlled (HCC)    Current moderate episode of major depressive disorder without prior episode (HCC)    Immunocompromised state (HCC)    Mediastinal adenopathy    Personal history of noncompliance with medical treatment    Pure hypercholesterolemia    SOB (shortness of breath)    Steroid-induced diabetes mellitus (HCC)    Steroid-induced hyperglycemia       Rudy Hdz is being seen at [facility] for management of Hepatic Sarcoidosis.      The active problem list, all pertinent past medical history, medications, liver histology, endoscopic studies, radiologic findings and laboratory findings related to the liver disorder were reviewed and discussed with the patient.      The patient is a 39 y.o. male who was found to have elevated liver enzymes in 2018.      Serologic evaluation for markers of chronic liver disease was negative.    CT scan of the liver

## 2024-04-27 PROBLEM — D86.89 HEPATIC GRANULOMA ASSOCIATED WITH SARCOIDOSIS: Status: ACTIVE | Noted: 2018-01-29

## 2024-04-27 PROBLEM — Z91.199 PERSONAL HISTORY OF NONCOMPLIANCE WITH MEDICAL TREATMENT: Status: RESOLVED | Noted: 2018-09-06 | Resolved: 2024-04-27

## 2024-04-27 PROBLEM — R73.9 STEROID-INDUCED HYPERGLYCEMIA: Status: RESOLVED | Noted: 2018-10-08 | Resolved: 2024-04-27

## 2024-04-27 PROBLEM — T38.0X5A STEROID-INDUCED HYPERGLYCEMIA: Status: RESOLVED | Noted: 2018-10-08 | Resolved: 2024-04-27

## 2024-04-27 PROBLEM — D86.0 PULMONARY SARCOIDOSIS (HCC): Status: ACTIVE | Noted: 2024-04-27

## 2024-04-27 PROBLEM — R74.8 ELEVATED LIVER ENZYMES: Status: RESOLVED | Noted: 2020-02-02 | Resolved: 2024-04-27

## 2024-04-27 PROBLEM — R06.02 SOB (SHORTNESS OF BREATH): Status: RESOLVED | Noted: 2024-03-12 | Resolved: 2024-04-27

## 2024-04-27 PROBLEM — R59.0 MEDIASTINAL ADENOPATHY: Status: RESOLVED | Noted: 2024-03-12 | Resolved: 2024-04-27

## 2024-09-10 ENCOUNTER — TELEPHONE (OUTPATIENT)
Age: 39
End: 2024-09-10

## 2025-03-23 ENCOUNTER — CLINICAL DOCUMENTATION (OUTPATIENT)
Age: 40
End: 2025-03-23

## 2025-03-26 ENCOUNTER — CLINICAL DOCUMENTATION (OUTPATIENT)
Age: 40
End: 2025-03-26

## 2025-03-26 NOTE — PROGRESS NOTES
[facility]     Shankar Puente MD, FACP, MACG, FAASLD   MD Kenna Machado, CORBIN Cooper, [facility]   Jackie Suarez, [facility]   Bella Homero, FN-C  Graland Morrow, [facility]   Bailee Hutson, [facility]   [address] [place] [address], [address]   [place]  [address] [phone]   FAX: [phone]  [facility]   [address]   [place]  [address] [phone]   FAX: [phone]       To: Whom this concerns    Date: 3/26/2025    RE: Rudy Hdz   1985      The above named patient has sarcoidosis of the liver.  A liver biopsy in 1/2024 demonstrated multiple granulomas consistent with sarcoidosis, inflammation and cirrhosis.    A lung biopsy and mediastinal node biopsy in 1/2018 also demonstrated sarcoidosis.    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  His pulmonary condition has already been reviewed and is considered to be service connected.    This letter is to document that sarcoidosis has also affected his liver and caused him to develop cirrhosis.    This was confirmed by liver biopsy in 1/2024.    He has been tested for for other causes of chronic liver disease which could lead to cirrhosis.  All of these tests were negative.    Given that Mr Hdz already has cirrhosis at the age of 39 years it is highly likely that he will eventually require a liver transplant and this should also be considered service connected.      Shankar Puente MD  [facility]  [address]  [place]  [address] [phone]  [facility]

## 2025-03-26 NOTE — PROGRESS NOTES
[facility]     Shankar Puente MD, FACP, MACG, FAASLD   MD Kenna Machado, CORBIN Cooper, [facility]   Jackie Suarez, [facility]   Bella Homero, FN-C  Garland Morrow, [facility]   Bailee Hutson, [facility]   [address] [place] [address], [address]   [place]  [address] [phone]   FAX: [phone]  [facility]   [address], [address]   [place]  [address] [phone]   FAX: [phone]       To: Whom this concerns    Date: 3/26/2025    RE: Rudy Hdz   1985      The above named patient has sarcoidosis of the liver.  A liver biopsy in 1/2028 demonstrated multiple granulomas consistent with sarcoidosis, inflammation and cirrhosis.    A lung biopsy and mediastinal node biopsy in 1/2018 also demonstrated sarcoidosis.    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  His pulmonary condition has already been reviewed and is considered to be service connected.    This letter is to document that sarcoidosis has also affected his liver and caused him to develop cirrhosis.    This was confirmed by liver biopsy in 1/2024.    He has been tested for for other causes of chronic liver disease which could lead to cirrhosis.  All of these tests were negative.    Given that Mr Hdz already has cirrhosis at the age of 39 years it is highly likely that he will eventually require a liver transplant and this should also be considered service connected.      Shankar Puente MD  [facility]  [address], [address]  [place]  [address] [phone]  [facility]      and that he

## (undated) DEVICE — MAYO STAND COVER: Brand: CONVERTORS

## (undated) DEVICE — KENDALL RADIOLUCENT FOAM MONITORING ELECTRODE RECTANGULAR SHAPE: Brand: KENDALL

## (undated) DEVICE — PREMIUM WET SKIN PREP TRAY: Brand: MEDLINE INDUSTRIES, INC.

## (undated) DEVICE — MAJ-1414 SINGLE USE ADPATER BIOPSY VALV: Brand: SINGLE USE ADAPTOR BIOPSY VALVE

## (undated) DEVICE — SOLUTION IV 1000ML 20MEQ K CHL IN 0.9% SOD CHL FLX CONT

## (undated) DEVICE — BANDAGE ADH W0.75XL3IN UNIV WVN FAB NAT GEN USE STRP N ADH

## (undated) DEVICE — SYRINGE MED 10ML LUERLOCK TIP W/O SFTY DISP

## (undated) DEVICE — PAD N ADH W3XL4IN POLY COT SFT PERF FLM EASILY CUT ABSRB

## (undated) DEVICE — HYPODERMIC SAFETY NEEDLE: Brand: MAGELLAN

## (undated) DEVICE — MARQUEE® DISPOSABLE CORE BIOPSY INSTRUMENT KIT, 16G X 16CM: Brand: MARQUEE

## (undated) DEVICE — DRAPE,APERTURE,MINOR PROCEDURE: Brand: MEDLINE

## (undated) DEVICE — INTENDED FOR TISSUE SEPARATION, AND OTHER PROCEDURES THAT REQUIRE A SHARP SURGICAL BLADE TO PUNCTURE OR CUT.: Brand: BARD-PARKER ®  SAFETY SCALPED

## (undated) DEVICE — SKIN MARKER,REGULAR TIP WITH RULER AND LABELS: Brand: DEVON